# Patient Record
Sex: FEMALE | Race: WHITE | Employment: UNEMPLOYED | URBAN - NONMETROPOLITAN AREA
[De-identification: names, ages, dates, MRNs, and addresses within clinical notes are randomized per-mention and may not be internally consistent; named-entity substitution may affect disease eponyms.]

---

## 2017-12-19 ENCOUNTER — HOSPITAL ENCOUNTER (OUTPATIENT)
Age: 32
Setting detail: SPECIMEN
Discharge: HOME | End: 2017-12-19
Payer: COMMERCIAL

## 2017-12-19 ENCOUNTER — OFFICE VISIT (OUTPATIENT)
Dept: OBGYN | Age: 32
End: 2017-12-19
Payer: COMMERCIAL

## 2017-12-19 VITALS
HEIGHT: 67 IN | DIASTOLIC BLOOD PRESSURE: 70 MMHG | WEIGHT: 141 LBS | BODY MASS INDEX: 22.13 KG/M2 | SYSTOLIC BLOOD PRESSURE: 114 MMHG | TEMPERATURE: 98.4 F | HEART RATE: 76 BPM

## 2017-12-19 DIAGNOSIS — Z01.419 WELL FEMALE EXAM WITH ROUTINE GYNECOLOGICAL EXAM: Primary | ICD-10-CM

## 2017-12-19 DIAGNOSIS — Z12.4 SCREENING FOR CERVICAL CANCER: ICD-10-CM

## 2017-12-19 PROCEDURE — 88142 CYTOPATH C/V THIN LAYER: CPT

## 2017-12-19 PROCEDURE — 99385 PREV VISIT NEW AGE 18-39: CPT | Performed by: OBSTETRICS & GYNECOLOGY

## 2017-12-19 PROCEDURE — 87624 HPV HI-RISK TYP POOLED RSLT: CPT

## 2017-12-19 ASSESSMENT — ENCOUNTER SYMPTOMS
EYES NEGATIVE: 1
GASTROINTESTINAL NEGATIVE: 1
RESPIRATORY NEGATIVE: 1

## 2017-12-19 NOTE — PATIENT INSTRUCTIONS
Patient Education        Well Visit, Ages 25 to 48: Care Instructions  Your Care Instructions    Physical exams can help you stay healthy. Your doctor has checked your overall health and may have suggested ways to take good care of yourself. He or she also may have recommended tests. At home, you can help prevent illness with healthy eating, regular exercise, and other steps. Follow-up care is a key part of your treatment and safety. Be sure to make and go to all appointments, and call your doctor if you are having problems. It's also a good idea to know your test results and keep a list of the medicines you take. How can you care for yourself at home? · Reach and stay at a healthy weight. This will lower your risk for many problems, such as obesity, diabetes, heart disease, and high blood pressure. · Get at least 30 minutes of physical activity on most days of the week. Walking is a good choice. You also may want to do other activities, such as running, swimming, cycling, or playing tennis or team sports. Discuss any changes in your exercise program with your doctor. · Do not smoke or allow others to smoke around you. If you need help quitting, talk to your doctor about stop-smoking programs and medicines. These can increase your chances of quitting for good. · Talk to your doctor about whether you have any risk factors for sexually transmitted infections (STIs). Having one sex partner (who does not have STIs and does not have sex with anyone else) is a good way to avoid these infections. · Use birth control if you do not want to have children at this time. Talk with your doctor about the choices available and what might be best for you. · Protect your skin from too much sun. When you're outdoors from 10 a.m. to 4 p.m., stay in the shade or cover up with clothing and a hat with a wide brim. Wear sunglasses that block UV rays.  Even when it's cloudy, put broad-spectrum sunscreen (SPF 30 or higher) on any condoms. For men  · Tests for sexually transmitted infections (STIs). Ask whether you should have tests for STIs. You may be at risk if you have sex with more than one person, especially if you do not wear a condom. · Testicular cancer exam. Ask your doctor whether you should check your testicles regularly. · Prostate exam. Talk to your doctor about whether you should have a blood test (called a PSA test) for prostate cancer. Experts differ on whether and when men should have this test. Some experts suggest it if you are older than 39 and are -American or have a father or brother who got prostate cancer when he was younger than 72. When should you call for help? Watch closely for changes in your health, and be sure to contact your doctor if you have any problems or symptoms that concern you. Where can you learn more? Go to [email or web address] org and sign in to your [employer] Support account. Enter P072 in the Gigantt box to learn more about \"Well Visit, Ages 25 to 48: Care Instructions. \"     If you do not have an account, please click on the \"Sign Up Now\" link. Current as of: May 12, 2017  Content Version: 11.4  © 0080-7680 Healthwise, Incorporated. Care instructions adapted under license by [facility] ([facility]). If you have questions about a medical condition or this instruction, always ask your healthcare professional. [address]  any warranty or liability for your use of this information.

## 2017-12-19 NOTE — PROGRESS NOTES
recommended at age 28 unless otherwise indicated. At age 36, annual mammogram screenings are recommended. Birth control options were discussed and pt will continue condoms. A well balanced diet and exercise were encouraged, as well as the addition of multivitamin. All questions answered and pt verbalized understanding.

## 2018-01-04 ENCOUNTER — HOSPITAL ENCOUNTER (OUTPATIENT)
Age: 33
Setting detail: SPECIMEN
Discharge: HOME | End: 2018-01-04
Payer: COMMERCIAL

## 2018-01-04 PROCEDURE — 88305 TISSUE EXAM BY PATHOLOGIST: CPT

## 2018-01-10 LAB
HPV SOURCE: NORMAL
HPV, HIGH RISK: NEGATIVE

## 2019-01-29 RX ORDER — OSELTAMIVIR PHOSPHATE 75 MG/1
75 CAPSULE ORAL DAILY
Qty: 10 CAPSULE | Refills: 0 | Status: SHIPPED | OUTPATIENT
Start: 2019-01-29 | End: 2019-02-08

## 2019-05-30 ENCOUNTER — EMPLOYEE WELLNESS (OUTPATIENT)
Dept: OTHER | Age: 34
End: 2019-05-30

## 2019-05-30 LAB
CHOLESTEROL, TOTAL: 178 MG/DL (ref 160–199)
GLUCOSE BLD-MCNC: 88 MG/DL (ref 74–109)
HDLC SERPL-MCNC: 55 MG/DL (ref 65–121)
LDL CHOLESTEROL CALCULATED: 110 MG/DL
TRIGL SERPL-MCNC: 63 MG/DL (ref 0–149)

## 2019-07-01 VITALS — WEIGHT: 131 LBS | BODY MASS INDEX: 20.52 KG/M2

## 2019-11-06 ENCOUNTER — OFFICE VISIT (OUTPATIENT)
Dept: OBGYN | Age: 34
End: 2019-11-06
Payer: COMMERCIAL

## 2019-11-06 VITALS
WEIGHT: 147 LBS | BODY MASS INDEX: 23.07 KG/M2 | HEIGHT: 67 IN | DIASTOLIC BLOOD PRESSURE: 82 MMHG | TEMPERATURE: 98.6 F | HEART RATE: 79 BPM | SYSTOLIC BLOOD PRESSURE: 125 MMHG

## 2019-11-06 DIAGNOSIS — R61 NIGHT SWEATS: ICD-10-CM

## 2019-11-06 DIAGNOSIS — Z12.4 SCREENING FOR CERVICAL CANCER: ICD-10-CM

## 2019-11-06 DIAGNOSIS — Z01.419 WOMEN'S ANNUAL ROUTINE GYNECOLOGICAL EXAMINATION: Primary | ICD-10-CM

## 2019-11-06 DIAGNOSIS — Z11.51 SCREENING FOR HPV (HUMAN PAPILLOMAVIRUS): ICD-10-CM

## 2019-11-06 DIAGNOSIS — N92.1 BREAKTHROUGH BLEEDING: ICD-10-CM

## 2019-11-06 LAB
ESTRADIOL LEVEL: 97.9 PG/ML
FOLLICLE STIMULATING HORMONE: 8.1 MIU/ML

## 2019-11-06 PROCEDURE — 99395 PREV VISIT EST AGE 18-39: CPT | Performed by: OBSTETRICS & GYNECOLOGY

## 2019-11-06 ASSESSMENT — ENCOUNTER SYMPTOMS
GASTROINTESTINAL NEGATIVE: 1
RESPIRATORY NEGATIVE: 1
EYES NEGATIVE: 1

## 2019-11-12 LAB
HPV COMMENT: NORMAL
HPV TYPE 16: NOT DETECTED
HPV TYPE 18: NOT DETECTED
HPVOH (OTHER TYPES): NOT DETECTED

## 2020-04-24 RX ORDER — DULOXETIN HYDROCHLORIDE 30 MG/1
30 CAPSULE, DELAYED RELEASE ORAL DAILY
Qty: 90 CAPSULE | Refills: 3 | Status: SHIPPED | OUTPATIENT
Start: 2020-04-24 | End: 2022-02-18 | Stop reason: SDUPTHER

## 2020-04-24 RX ORDER — PANTOPRAZOLE SODIUM 40 MG/1
40 TABLET, DELAYED RELEASE ORAL DAILY
Qty: 90 TABLET | Refills: 11 | Status: SHIPPED | OUTPATIENT
Start: 2020-04-24

## 2020-09-07 ENCOUNTER — OFFICE VISIT (OUTPATIENT)
Age: 35
End: 2020-09-07

## 2020-09-07 VITALS — OXYGEN SATURATION: 99 % | TEMPERATURE: 97.5 F | HEART RATE: 82 BPM

## 2020-09-07 PROCEDURE — 99999 PR OFFICE/OUTPT VISIT,PROCEDURE ONLY: CPT | Performed by: NURSE PRACTITIONER

## 2020-09-10 LAB — SARS-COV-2, NAA: NOT DETECTED

## 2020-11-16 ENCOUNTER — TELEPHONE (OUTPATIENT)
Dept: OBGYN | Age: 35
End: 2020-11-16

## 2022-02-01 DIAGNOSIS — G47.00 INSOMNIA, UNSPECIFIED TYPE: ICD-10-CM

## 2022-02-01 RX ORDER — ESZOPICLONE 1 MG/1
TABLET, FILM COATED ORAL
Qty: 30 TABLET | Refills: 5 | Status: SHIPPED | OUTPATIENT
Start: 2022-02-01 | End: 2022-03-01

## 2022-02-18 RX ORDER — DULOXETIN HYDROCHLORIDE 30 MG/1
30 CAPSULE, DELAYED RELEASE ORAL DAILY
Qty: 90 CAPSULE | Refills: 4 | Status: SHIPPED | OUTPATIENT
Start: 2022-02-18

## 2023-02-21 RX ORDER — DULOXETIN HYDROCHLORIDE 30 MG/1
CAPSULE, DELAYED RELEASE ORAL
Qty: 90 CAPSULE | Refills: 4 | Status: SHIPPED | OUTPATIENT
Start: 2023-02-21

## 2023-04-27 LAB
25(OH)D3 SERPL-MCNC: 39.8 NG/ML
ALBUMIN SERPL-MCNC: 4.4 G/DL (ref 3.5–5.2)
ALP SERPL-CCNC: 44 U/L (ref 35–104)
ALT SERPL-CCNC: 13 U/L (ref 5–33)
ANION GAP SERPL CALCULATED.3IONS-SCNC: 14 MMOL/L (ref 7–19)
AST SERPL-CCNC: 18 U/L (ref 5–32)
BASOPHILS # BLD: 0.1 K/UL (ref 0–0.2)
BASOPHILS NFR BLD: 1.1 % (ref 0–1)
BILIRUB SERPL-MCNC: 0.3 MG/DL (ref 0.2–1.2)
BUN SERPL-MCNC: 14 MG/DL (ref 6–20)
CALCIUM SERPL-MCNC: 9.1 MG/DL (ref 8.6–10)
CHLORIDE SERPL-SCNC: 102 MMOL/L (ref 98–111)
CHOLEST SERPL-MCNC: 229 MG/DL (ref 160–199)
CO2 SERPL-SCNC: 23 MMOL/L (ref 22–29)
CREAT SERPL-MCNC: 0.9 MG/DL (ref 0.5–0.9)
EOSINOPHIL # BLD: 0.3 K/UL (ref 0–0.6)
EOSINOPHIL NFR BLD: 3.7 % (ref 0–5)
ERYTHROCYTE [DISTWIDTH] IN BLOOD BY AUTOMATED COUNT: 14.5 % (ref 11.5–14.5)
GLUCOSE SERPL-MCNC: 91 MG/DL (ref 74–109)
HBA1C MFR BLD: 5.6 % (ref 4–6)
HCT VFR BLD AUTO: 34 % (ref 37–47)
HDLC SERPL-MCNC: 73 MG/DL (ref 65–121)
HGB BLD-MCNC: 10.8 G/DL (ref 12–16)
IMM GRANULOCYTES # BLD: 0 K/UL
LDLC SERPL CALC-MCNC: 139 MG/DL
LYMPHOCYTES # BLD: 1.2 K/UL (ref 1.1–4.5)
LYMPHOCYTES NFR BLD: 15.8 % (ref 20–40)
MCH RBC QN AUTO: 27.8 PG (ref 27–31)
MCHC RBC AUTO-ENTMCNC: 31.8 G/DL (ref 33–37)
MCV RBC AUTO: 87.6 FL (ref 81–99)
MONOCYTES # BLD: 0.6 K/UL (ref 0–0.9)
MONOCYTES NFR BLD: 8 % (ref 0–10)
NEUTROPHILS # BLD: 5.4 K/UL (ref 1.5–7.5)
NEUTS SEG NFR BLD: 71 % (ref 50–65)
PLATELET # BLD AUTO: 345 K/UL (ref 130–400)
PMV BLD AUTO: 9.8 FL (ref 9.4–12.3)
POTASSIUM SERPL-SCNC: 3.8 MMOL/L (ref 3.5–5)
PROT SERPL-MCNC: 6.8 G/DL (ref 6.6–8.7)
RBC # BLD AUTO: 3.88 M/UL (ref 4.2–5.4)
SODIUM SERPL-SCNC: 139 MMOL/L (ref 136–145)
T4 FREE SERPL-MCNC: 1.08 NG/DL (ref 0.93–1.7)
TRIGL SERPL-MCNC: 85 MG/DL (ref 0–149)
TSH SERPL DL<=0.005 MIU/L-ACNC: 1.57 UIU/ML (ref 0.27–4.2)
WBC # BLD AUTO: 7.5 K/UL (ref 4.8–10.8)

## 2023-04-29 LAB
APO B100 SERPL-MCNC: 101 MG/DL (ref 55–125)
LPA SERPL-MCNC: 162 MG/DL

## 2023-12-04 LAB
BASOPHILS # BLD: 0.1 K/UL (ref 0–0.2)
BASOPHILS NFR BLD: 1.1 % (ref 0–1)
EOSINOPHIL # BLD: 0.5 K/UL (ref 0–0.6)
EOSINOPHIL NFR BLD: 5.7 % (ref 0–5)
ERYTHROCYTE [DISTWIDTH] IN BLOOD BY AUTOMATED COUNT: 12.9 % (ref 11.5–14.5)
FERRITIN SERPL-MCNC: 14.9 NG/ML (ref 13–150)
HCT VFR BLD AUTO: 36.7 % (ref 37–47)
HGB BLD-MCNC: 12.2 G/DL (ref 12–16)
IMM GRANULOCYTES # BLD: 0.1 K/UL
IRON SERPL-MCNC: 51 UG/DL (ref 37–145)
LYMPHOCYTES # BLD: 1.6 K/UL (ref 1.1–4.5)
LYMPHOCYTES NFR BLD: 20.5 % (ref 20–40)
MCH RBC QN AUTO: 30.5 PG (ref 27–31)
MCHC RBC AUTO-ENTMCNC: 33.2 G/DL (ref 33–37)
MCV RBC AUTO: 91.8 FL (ref 81–99)
MONOCYTES # BLD: 0.7 K/UL (ref 0–0.9)
MONOCYTES NFR BLD: 8.3 % (ref 0–10)
NEUTROPHILS # BLD: 5 K/UL (ref 1.5–7.5)
NEUTS SEG NFR BLD: 63.8 % (ref 50–65)
PLATELET # BLD AUTO: 302 K/UL (ref 130–400)
PMV BLD AUTO: 9.1 FL (ref 9.4–12.3)
RBC # BLD AUTO: 4 M/UL (ref 4.2–5.4)
TIBC SERPL-MCNC: 387 UG/DL (ref 250–400)
WBC # BLD AUTO: 7.8 K/UL (ref 4.8–10.8)

## 2024-04-30 ENCOUNTER — OFFICE VISIT (OUTPATIENT)
Age: 39
End: 2024-04-30
Payer: COMMERCIAL

## 2024-04-30 VITALS
SYSTOLIC BLOOD PRESSURE: 122 MMHG | HEIGHT: 67 IN | BODY MASS INDEX: 26.06 KG/M2 | WEIGHT: 166 LBS | DIASTOLIC BLOOD PRESSURE: 72 MMHG

## 2024-04-30 DIAGNOSIS — Z01.419 ENCOUNTER FOR GYNECOLOGICAL EXAMINATION WITHOUT ABNORMAL FINDING: ICD-10-CM

## 2024-04-30 DIAGNOSIS — N92.6 LATE MENSES: Primary | ICD-10-CM

## 2024-04-30 DIAGNOSIS — Z12.4 SCREENING FOR CERVICAL CANCER: ICD-10-CM

## 2024-04-30 LAB
B-HCG UR QL: POSITIVE
EXPIRATION DATE: ABNORMAL
INTERNAL NEGATIVE CONTROL: NEGATIVE
INTERNAL POSITIVE CONTROL: POSITIVE
Lab: ABNORMAL

## 2024-04-30 PROCEDURE — 81025 URINE PREGNANCY TEST: CPT | Performed by: OBSTETRICS & GYNECOLOGY

## 2024-04-30 PROCEDURE — 87624 HPV HI-RISK TYP POOLED RSLT: CPT | Performed by: OBSTETRICS & GYNECOLOGY

## 2024-04-30 PROCEDURE — G0123 SCREEN CERV/VAG THIN LAYER: HCPCS | Performed by: OBSTETRICS & GYNECOLOGY

## 2024-04-30 PROCEDURE — 99386 PREV VISIT NEW AGE 40-64: CPT | Performed by: OBSTETRICS & GYNECOLOGY

## 2024-04-30 RX ORDER — BUSPIRONE HYDROCHLORIDE 7.5 MG/1
TABLET ORAL
COMMUNITY
End: 2024-05-03

## 2024-04-30 RX ORDER — CETIRIZINE HYDROCHLORIDE 10 MG/1
1 TABLET ORAL DAILY
COMMUNITY

## 2024-04-30 NOTE — PROGRESS NOTES
"CC: annual exam    SUBJECTIVE: Mimi Armstrong is a 38 y.o. female , para 2, who comes to the office today for annual GYN examination. Last menstrual period was 4 weeks ago and her last Pap smear was 11/2019, and was normal. She has no history of cervical dysplasia. She is currently not on contraception and is a little late for her period. Her medical history is reviewed. She is having some breast tenderness and mild nausea.    HPI      Social History     Tobacco Use    Smoking status: Never    Smokeless tobacco: Never   Vaping Use    Vaping status: Never Used   Substance Use Topics    Alcohol use: Yes     Comment: SOCIAL    Drug use: No        Review of Systems   Constitutional:  Negative for fever.   Respiratory:  Negative for cough.    Genitourinary:  Negative for vaginal bleeding.   Hematological:  Does not bruise/bleed easily.       Visit Vitals  /72 (BP Location: Right arm, Patient Position: Sitting)   Ht 170.2 cm (67\")   Wt 75.3 kg (166 lb)   LMP 03/26/2024   BMI 26.00 kg/m²      Objective   Physical Exam  Vitals reviewed.   Constitutional:       Appearance: She is well-developed.   HENT:      Head: Normocephalic and atraumatic.   Eyes:      General: No scleral icterus.  Neck:      Trachea: No tracheal deviation.   Cardiovascular:      Rate and Rhythm: Normal rate and regular rhythm.   Pulmonary:      Effort: Pulmonary effort is normal.      Breath sounds: Normal breath sounds.   Abdominal:      General: Bowel sounds are normal. There is no distension.      Palpations: Abdomen is soft.      Tenderness: There is no abdominal tenderness.   Genitourinary:     Exam position: Supine.      Labia:         Right: No lesion.         Left: No lesion.       Vagina: No vaginal discharge or bleeding.      Cervix: No cervical motion tenderness.      Uterus: Not enlarged, not fixed and not tender.       Adnexa:         Right: No mass.          Left: No mass.        Rectum: No external hemorrhoid.      Comments: Pap " done  Skin:     General: Skin is warm and dry.   Neurological:      Mental Status: She is alert and oriented to person, place, and time.       UPT in the office positive    Assessment/Plan   Diagnoses and all orders for this visit:    1. Late menses (Primary)  -     POC Pregnancy, Urine  -     HCG, B-subunit, Quantitative ([facility] Only)  -     ABO / Rh  -     Antibody Screen    2. Encounter for gynecological examination without abnormal finding    3. Screening for cervical cancer  -     Liquid-based Pap Smear, Screening      We will notify her when the Pap smear results are available. We have discussed current Pap smear screening guidelines.  She will return pending HCG results. In the meantime if she develops questions or problems, she will notify the office.

## 2024-05-01 LAB
ABO GROUP BLD: NORMAL
BLD GP AB SCN SERPL QL: NEGATIVE
GEN CATEG CVX/VAG CYTO-IMP: NORMAL
HCG INTACT+B SERPL-ACNC: 7369 MIU/ML
HPV I/H RISK 4 DNA CVX QL PROBE+SIG AMP: NOT DETECTED
LAB AP CASE REPORT: NORMAL
LAB AP GYN ADDITIONAL INFORMATION: NORMAL
Lab: NORMAL
PATH INTERP SPEC-IMP: NORMAL
RH BLD: NEGATIVE
STAT OF ADQ CVX/VAG CYTO-IMP: NORMAL

## 2024-05-03 ENCOUNTER — OFFICE VISIT (OUTPATIENT)
Age: 39
End: 2024-05-03
Payer: COMMERCIAL

## 2024-05-03 VITALS
BODY MASS INDEX: 25.9 KG/M2 | HEIGHT: 67 IN | DIASTOLIC BLOOD PRESSURE: 74 MMHG | SYSTOLIC BLOOD PRESSURE: 112 MMHG | WEIGHT: 165 LBS

## 2024-05-03 DIAGNOSIS — Z34.90 EARLY STAGE OF PREGNANCY: Primary | ICD-10-CM

## 2024-05-03 PROCEDURE — 99213 OFFICE O/P EST LOW 20 MIN: CPT | Performed by: OBSTETRICS & GYNECOLOGY

## 2024-05-10 ENCOUNTER — INITIAL PRENATAL (OUTPATIENT)
Age: 39
End: 2024-05-10
Payer: COMMERCIAL

## 2024-05-10 VITALS — DIASTOLIC BLOOD PRESSURE: 82 MMHG | BODY MASS INDEX: 25.53 KG/M2 | SYSTOLIC BLOOD PRESSURE: 112 MMHG | WEIGHT: 163 LBS

## 2024-05-10 DIAGNOSIS — Z83.2 FAMILY HISTORY OF FACTOR V LEIDEN MUTATION: ICD-10-CM

## 2024-05-10 DIAGNOSIS — O09.521 MULTIGRAVIDA OF ADVANCED MATERNAL AGE IN FIRST TRIMESTER: Primary | ICD-10-CM

## 2024-05-10 RX ORDER — BUSPIRONE HYDROCHLORIDE 7.5 MG/1
TABLET ORAL
COMMUNITY
Start: 2024-05-07

## 2024-05-10 RX ORDER — ONDANSETRON 4 MG/1
TABLET, ORALLY DISINTEGRATING ORAL
COMMUNITY
Start: 2024-05-03 | End: 2024-05-10

## 2024-05-19 LAB
BACTERIA UR CULT: NORMAL
BACTERIA UR CULT: NORMAL
BASOPHILS # BLD AUTO: 0.1 X10E3/UL (ref 0–0.2)
BASOPHILS NFR BLD AUTO: 1 %
C TRACH RRNA SPEC QL NAA+PROBE: NEGATIVE
DRUGS UR: NORMAL
EOSINOPHIL # BLD AUTO: 0.1 X10E3/UL (ref 0–0.4)
EOSINOPHIL NFR BLD AUTO: 1 %
ERYTHROCYTE [DISTWIDTH] IN BLOOD BY AUTOMATED COUNT: 12.5 % (ref 11.7–15.4)
HBV SURFACE AG SERPL QL IA: NEGATIVE
HCT VFR BLD AUTO: 35.9 % (ref 34–46.6)
HCV IGG SERPL QL IA: NON REACTIVE
HGB BLD-MCNC: 11.6 G/DL (ref 11.1–15.9)
HIV 1+2 AB+HIV1 P24 AG SERPL QL IA: NON REACTIVE
IMM GRANULOCYTES # BLD AUTO: 0 X10E3/UL (ref 0–0.1)
IMM GRANULOCYTES NFR BLD AUTO: 0 %
LYMPHOCYTES # BLD AUTO: 0.9 X10E3/UL (ref 0.7–3.1)
LYMPHOCYTES NFR BLD AUTO: 10 %
MCH RBC QN AUTO: 29.1 PG (ref 26.6–33)
MCHC RBC AUTO-ENTMCNC: 32.3 G/DL (ref 31.5–35.7)
MCV RBC AUTO: 90 FL (ref 79–97)
MONOCYTES # BLD AUTO: 0.7 X10E3/UL (ref 0.1–0.9)
MONOCYTES NFR BLD AUTO: 8 %
N GONORRHOEA RRNA SPEC QL NAA+PROBE: NEGATIVE
NEUTROPHILS # BLD AUTO: 7.4 X10E3/UL (ref 1.4–7)
NEUTROPHILS NFR BLD AUTO: 80 %
PLATELET # BLD AUTO: 315 X10E3/UL (ref 150–450)
RBC # BLD AUTO: 3.98 X10E6/UL (ref 3.77–5.28)
RPR SER QL: NON REACTIVE
RUBV IGG SERPL IA-ACNC: 6.02 INDEX
WBC # BLD AUTO: 9.2 X10E3/UL (ref 3.4–10.8)

## 2024-05-30 LAB
F5 P.R506Q BLD/T QL: ABNORMAL
IMP & REVIEW OF LAB RESULTS: ABNORMAL

## 2024-06-04 ENCOUNTER — TELEPHONE (OUTPATIENT)
Age: 39
End: 2024-06-04
Payer: COMMERCIAL

## 2024-06-04 ENCOUNTER — ROUTINE PRENATAL (OUTPATIENT)
Age: 39
End: 2024-06-04
Payer: COMMERCIAL

## 2024-06-04 VITALS — WEIGHT: 168 LBS | DIASTOLIC BLOOD PRESSURE: 84 MMHG | SYSTOLIC BLOOD PRESSURE: 124 MMHG | BODY MASS INDEX: 26.31 KG/M2

## 2024-06-04 DIAGNOSIS — O09.521 MULTIGRAVIDA OF ADVANCED MATERNAL AGE IN FIRST TRIMESTER: Primary | ICD-10-CM

## 2024-06-04 RX ORDER — PRENATAL VIT NO.126/IRON/FOLIC 28MG-0.8MG
TABLET ORAL DAILY
COMMUNITY

## 2024-06-04 NOTE — PROGRESS NOTES
Feeling well, no nausea  Reviewed dating ultrasound  FHTs by US today  Reviewed normal prenatal labs  ffDNA today    Diagnoses and all orders for this visit:    1. Multigravida of advanced maternal age in first trimester (Primary)  -     Nell Bryan Prenatal Test: Chromosomes 13, 18, 21, X & Y: Triploidy 22Q.11.2 Deletion - Blood, Blood, Venous

## 2024-06-04 NOTE — TELEPHONE ENCOUNTER
Attempted to reach pt by phone but no answer. She has appt with Dr Dacosta today at 1130 but I will need to get FHTs by US if patient can be here at at 11a.

## 2024-06-13 ENCOUNTER — TELEPHONE (OUTPATIENT)
Dept: OBSTETRICS AND GYNECOLOGY | Age: 39
End: 2024-06-13
Payer: COMMERCIAL

## 2024-06-13 NOTE — TELEPHONE ENCOUNTER
Pt has an appt scheduled for tomorrow to receive botox injections in her forehead. Pt asking if this would be ok to do during pregnancy. Per Dr. William, botox injections are not advised during pregnancy. Pt voiced understanding

## 2024-07-01 ENCOUNTER — ROUTINE PRENATAL (OUTPATIENT)
Age: 39
End: 2024-07-01
Payer: COMMERCIAL

## 2024-07-01 VITALS — DIASTOLIC BLOOD PRESSURE: 76 MMHG | SYSTOLIC BLOOD PRESSURE: 112 MMHG | WEIGHT: 174 LBS | BODY MASS INDEX: 27.25 KG/M2

## 2024-07-01 DIAGNOSIS — O09.522 MULTIGRAVIDA OF ADVANCED MATERNAL AGE IN SECOND TRIMESTER: Primary | ICD-10-CM

## 2024-07-01 DIAGNOSIS — O26.892 RH NEGATIVE STATUS DURING PREGNANCY IN SECOND TRIMESTER: ICD-10-CM

## 2024-07-01 DIAGNOSIS — Z67.91 RH NEGATIVE STATUS DURING PREGNANCY IN SECOND TRIMESTER: ICD-10-CM

## 2024-07-01 PROCEDURE — 0502F SUBSEQUENT PRENATAL CARE: CPT | Performed by: OBSTETRICS & GYNECOLOGY

## 2024-07-01 NOTE — PROGRESS NOTES
Feeling well, no nausea  May have felt a flutter of movement  Discussed normal ffDNA results    Diagnoses and all orders for this visit:    1. Multigravida of advanced maternal age in second trimester (Primary)    2. Rh negative status during pregnancy in second trimester

## 2024-07-11 ENCOUNTER — HOSPITAL ENCOUNTER (OUTPATIENT)
Age: 39
Setting detail: SPECIMEN
Discharge: HOME | End: 2024-07-11
Payer: COMMERCIAL

## 2024-07-11 PROCEDURE — 88305 TISSUE EXAM BY PATHOLOGIST: CPT

## 2024-07-22 ENCOUNTER — TELEPHONE (OUTPATIENT)
Age: 39
End: 2024-07-22
Payer: COMMERCIAL

## 2024-07-22 NOTE — TELEPHONE ENCOUNTER
"Pt 16w4d and calls with c/o spotting/light bleeding and menstrual like cramps. Pt states spotting has been x4 days and at times bright red. Cramping started last night and \"was so bad at times it was shooting down my legs\". The cramping is much better today but still c/o spotting. Blood type A negative. Reviewed with Dr Dacosta and pt to come to office tomorrow for 730 US and 8a visit with Dr Dacosta. Pt voiced understanding.   "

## 2024-07-23 ENCOUNTER — ROUTINE PRENATAL (OUTPATIENT)
Age: 39
End: 2024-07-23
Payer: COMMERCIAL

## 2024-07-23 VITALS — SYSTOLIC BLOOD PRESSURE: 118 MMHG | DIASTOLIC BLOOD PRESSURE: 76 MMHG | BODY MASS INDEX: 27.88 KG/M2 | WEIGHT: 178 LBS

## 2024-07-23 DIAGNOSIS — Z67.91 RH NEGATIVE STATUS DURING PREGNANCY IN SECOND TRIMESTER: ICD-10-CM

## 2024-07-23 DIAGNOSIS — O46.92 VAGINAL BLEEDING IN PREGNANCY, SECOND TRIMESTER: Primary | ICD-10-CM

## 2024-07-23 DIAGNOSIS — O26.892 RH NEGATIVE STATUS DURING PREGNANCY IN SECOND TRIMESTER: ICD-10-CM

## 2024-07-23 PROCEDURE — 96372 THER/PROPH/DIAG INJ SC/IM: CPT | Performed by: OBSTETRICS & GYNECOLOGY

## 2024-07-23 PROCEDURE — 0502F SUBSEQUENT PRENATAL CARE: CPT | Performed by: OBSTETRICS & GYNECOLOGY

## 2024-07-23 NOTE — PROGRESS NOTES
Had spotting start 4-5 days ago, sometimes brown, sometimes brighter red  Associated cramping, but no cramps today, faint brown spotting today  US today shows posterior previa, normal FHR  Blood type A negative    Diagnoses and all orders for this visit:    1. Vaginal bleeding in pregnancy, second trimester (Primary)  -     Rhogam Immune Globulin Immunization    2. Rh negative status during pregnancy in second trimester

## 2024-07-29 ENCOUNTER — ROUTINE PRENATAL (OUTPATIENT)
Age: 39
End: 2024-07-29
Payer: COMMERCIAL

## 2024-07-29 VITALS — DIASTOLIC BLOOD PRESSURE: 84 MMHG | BODY MASS INDEX: 28.35 KG/M2 | WEIGHT: 181 LBS | SYSTOLIC BLOOD PRESSURE: 112 MMHG

## 2024-07-29 DIAGNOSIS — O46.92 VAGINAL BLEEDING IN PREGNANCY, SECOND TRIMESTER: Primary | ICD-10-CM

## 2024-07-29 DIAGNOSIS — Z67.91 RH NEGATIVE STATUS DURING PREGNANCY IN SECOND TRIMESTER: ICD-10-CM

## 2024-07-29 DIAGNOSIS — O26.892 RH NEGATIVE STATUS DURING PREGNANCY IN SECOND TRIMESTER: ICD-10-CM

## 2024-07-29 PROCEDURE — 0502F SUBSEQUENT PRENATAL CARE: CPT | Performed by: OBSTETRICS & GYNECOLOGY

## 2024-07-29 NOTE — PROGRESS NOTES
Starting to feel fetal movement  Still having some brown spotting, no cramping now  Schedule anatomy US 8/15    Diagnoses and all orders for this visit:    1. Vaginal bleeding in pregnancy, second trimester (Primary)    2. Rh negative status during pregnancy in second trimester

## 2024-08-27 ENCOUNTER — HOSPITAL ENCOUNTER (OUTPATIENT)
Age: 39
Setting detail: SPECIMEN
Discharge: HOME | End: 2024-08-27

## 2024-08-27 ENCOUNTER — ROUTINE PRENATAL (OUTPATIENT)
Age: 39
End: 2024-08-27
Payer: COMMERCIAL

## 2024-08-27 VITALS — BODY MASS INDEX: 29.29 KG/M2 | WEIGHT: 187 LBS | SYSTOLIC BLOOD PRESSURE: 122 MMHG | DIASTOLIC BLOOD PRESSURE: 82 MMHG

## 2024-08-27 DIAGNOSIS — O44.02 PLACENTA PREVIA IN SECOND TRIMESTER: ICD-10-CM

## 2024-08-27 DIAGNOSIS — O26.892 RH NEGATIVE STATUS DURING PREGNANCY IN SECOND TRIMESTER: Primary | ICD-10-CM

## 2024-08-27 DIAGNOSIS — Z67.91 RH NEGATIVE STATUS DURING PREGNANCY IN SECOND TRIMESTER: Primary | ICD-10-CM

## 2024-08-27 DIAGNOSIS — O09.522 MULTIGRAVIDA OF ADVANCED MATERNAL AGE IN SECOND TRIMESTER: ICD-10-CM

## 2024-08-27 PROCEDURE — 0502F SUBSEQUENT PRENATAL CARE: CPT | Performed by: OBSTETRICS & GYNECOLOGY

## 2024-08-27 RX ORDER — ASPIRIN 81 MG/1
81 TABLET ORAL DAILY
COMMUNITY

## 2024-08-27 NOTE — PROGRESS NOTES
Good fetal movement  No vaginal bleeding  Reviewed normal anatomy US  Posterior previa noted, will recheck at 32 weeks  Discussed possible dizzy spells and ensuring adequate hydration    Diagnoses and all orders for this visit:    1. Rh negative status during pregnancy in second trimester (Primary)    2. Multigravida of advanced maternal age in second trimester    3. Placenta previa in second trimester

## 2024-09-24 ENCOUNTER — ROUTINE PRENATAL (OUTPATIENT)
Age: 39
End: 2024-09-24
Payer: COMMERCIAL

## 2024-09-24 VITALS — SYSTOLIC BLOOD PRESSURE: 110 MMHG | WEIGHT: 194 LBS | BODY MASS INDEX: 30.38 KG/M2 | DIASTOLIC BLOOD PRESSURE: 82 MMHG

## 2024-09-24 DIAGNOSIS — Z67.91 RH NEGATIVE STATUS DURING PREGNANCY IN SECOND TRIMESTER: Primary | ICD-10-CM

## 2024-09-24 DIAGNOSIS — O44.02 PLACENTA PREVIA IN SECOND TRIMESTER: ICD-10-CM

## 2024-09-24 DIAGNOSIS — O26.892 RH NEGATIVE STATUS DURING PREGNANCY IN SECOND TRIMESTER: Primary | ICD-10-CM

## 2024-09-24 DIAGNOSIS — O09.522 MULTIGRAVIDA OF ADVANCED MATERNAL AGE IN SECOND TRIMESTER: ICD-10-CM

## 2024-09-24 PROCEDURE — 0502F SUBSEQUENT PRENATAL CARE: CPT | Performed by: OBSTETRICS & GYNECOLOGY

## 2024-10-15 ENCOUNTER — ROUTINE PRENATAL (OUTPATIENT)
Age: 39
End: 2024-10-15
Payer: COMMERCIAL

## 2024-10-15 VITALS — BODY MASS INDEX: 31.48 KG/M2 | SYSTOLIC BLOOD PRESSURE: 122 MMHG | WEIGHT: 201 LBS | DIASTOLIC BLOOD PRESSURE: 72 MMHG

## 2024-10-15 DIAGNOSIS — O44.03 PLACENTA PREVIA, THIRD TRIMESTER: ICD-10-CM

## 2024-10-15 DIAGNOSIS — O09.523 MULTIGRAVIDA OF ADVANCED MATERNAL AGE IN THIRD TRIMESTER: ICD-10-CM

## 2024-10-15 DIAGNOSIS — Z67.91 RH NEGATIVE, ANTEPARTUM, THIRD TRIMESTER: Primary | ICD-10-CM

## 2024-10-15 DIAGNOSIS — O26.893 RH NEGATIVE, ANTEPARTUM, THIRD TRIMESTER: Primary | ICD-10-CM

## 2024-10-15 NOTE — PROGRESS NOTES
Good fetal movement  Glucola and Hgb today, Rhogam today  Flu vaccine today  Discuss Tdap next visit  Discussed Trevett Hutchins contractions    Diagnoses and all orders for this visit:    1. Rh negative, antepartum, third trimester (Primary)  -     Antibody Screen  -     Rhogam Immune Globulin Immunization    2. Multigravida of advanced maternal age in third trimester  -     Gestational Screen 1 Hr ([facility])  -     Hemoglobin  -     RPR Qualitative with Reflex to Quant  -     Fluzone >6mos ([id number])    3. Placenta previa, third trimester

## 2024-10-16 LAB
BLD GP AB SCN SERPL QL: NORMAL
BLD GP AB SCN SERPL QL: POSITIVE
BLOOD GROUP ANTIBODIES SERPL: ABNORMAL
GLUCOSE 1H P 50 G GLC PO SERPL-MCNC: 157 MG/DL (ref 70–139)
HGB BLD-MCNC: 9.3 G/DL (ref 11.1–15.9)
RPR SER QL: NON REACTIVE
XXX BLOOD GROUP AB TITR SERPL AHG: ABNORMAL {TITER}

## 2024-10-23 DIAGNOSIS — O99.810 ABNORMAL GLUCOSE TOLERANCE IN PREGNANCY: Primary | ICD-10-CM

## 2024-10-29 ENCOUNTER — ROUTINE PRENATAL (OUTPATIENT)
Age: 39
End: 2024-10-29
Payer: COMMERCIAL

## 2024-10-29 VITALS — DIASTOLIC BLOOD PRESSURE: 80 MMHG | BODY MASS INDEX: 31.32 KG/M2 | SYSTOLIC BLOOD PRESSURE: 118 MMHG | WEIGHT: 200 LBS

## 2024-10-29 DIAGNOSIS — O99.019 MATERNAL ANEMIA IN PREGNANCY, ANTEPARTUM: ICD-10-CM

## 2024-10-29 DIAGNOSIS — O26.893 RH NEGATIVE, ANTEPARTUM, THIRD TRIMESTER: Primary | ICD-10-CM

## 2024-10-29 DIAGNOSIS — O44.03 PLACENTA PREVIA, THIRD TRIMESTER: ICD-10-CM

## 2024-10-29 DIAGNOSIS — O99.810 ABNORMAL GLUCOSE TOLERANCE IN PREGNANCY: ICD-10-CM

## 2024-10-29 DIAGNOSIS — Z67.91 RH NEGATIVE, ANTEPARTUM, THIRD TRIMESTER: Primary | ICD-10-CM

## 2024-10-29 DIAGNOSIS — O99.810 ABNORMAL GLUCOSE TOLERANCE IN PREGNANCY: Primary | ICD-10-CM

## 2024-10-29 DIAGNOSIS — O09.523 MULTIGRAVIDA OF ADVANCED MATERNAL AGE IN THIRD TRIMESTER: ICD-10-CM

## 2024-10-29 RX ORDER — FERROUS SULFATE 325(65) MG
325 TABLET ORAL
COMMUNITY

## 2024-10-29 NOTE — PROGRESS NOTES
Good fetal movement  No contractions, no reflux  Has MFM US next visit  Iron supplement once daily for anemia  3 hour GTT today  Tdap in office today   labor precautions    Diagnoses and all orders for this visit:    1. Rh negative, antepartum, third trimester (Primary)    2. Abnormal glucose tolerance in pregnancy    3. Multigravida of advanced maternal age in third trimester  -     Tdap Vaccine Greater Than or Equal To 6yo IM    4. Placenta previa, third trimester    5. Maternal anemia in pregnancy, antepartum

## 2024-10-30 LAB
GLUCOSE 1H P 100 G GLC PO SERPL-MCNC: 186 MG/DL (ref 70–179)
GLUCOSE 2H P 100 G GLC PO SERPL-MCNC: 178 MG/DL (ref 70–154)
GLUCOSE 3H P 100 G GLC PO SERPL-MCNC: 97 MG/DL (ref 70–139)
GLUCOSE P FAST SERPL-MCNC: 82 MG/DL (ref 70–94)
Lab: ABNORMAL

## 2024-10-31 ENCOUNTER — TELEPHONE (OUTPATIENT)
Age: 39
End: 2024-10-31
Payer: COMMERCIAL

## 2024-10-31 RX ORDER — BLOOD-GLUCOSE METER
KIT MISCELLANEOUS
Qty: 1 EACH | Refills: 0 | Status: SHIPPED | OUTPATIENT
Start: 2024-10-31

## 2024-10-31 RX ORDER — LANCETS 30 GAUGE
EACH MISCELLANEOUS
Qty: 200 EACH | Refills: 2 | Status: SHIPPED | OUTPATIENT
Start: 2024-10-31

## 2024-10-31 NOTE — TELEPHONE ENCOUNTER
Spoke with pt by phone and informed her of failed 3h gtt and gestational diabetes diagnosis. She will begin checking glucoses QID and keep log for review at future visits. She declines meeting with Josie Betancourt at this time.

## 2024-10-31 NOTE — TELEPHONE ENCOUNTER
----- Message from Moises Dacosta sent at 10/30/2024 11:23 AM CDT -----  Notify patient abnormal 3-hour glucose test.  She needs to begin checking blood sugars fasting and after meals.  Please send appropriate supplies.

## 2024-11-11 ENCOUNTER — HOSPITAL ENCOUNTER (INPATIENT)
Facility: HOSPITAL | Age: 39
LOS: 3 days | Discharge: HOME | End: 2024-11-14
Attending: OBSTETRICS & GYNECOLOGY | Admitting: OBSTETRICS & GYNECOLOGY
Payer: COMMERCIAL

## 2024-11-11 PROBLEM — Z91.89: Status: ACTIVE | Noted: 2024-11-11

## 2024-11-11 PROBLEM — O44.10 ANTEPARTUM HEMORRHAGE FROM PLACENTA PREVIA: Status: ACTIVE | Noted: 2024-11-11

## 2024-11-11 LAB
ABO GROUP BLD: NORMAL
APTT PPP: 24.7 SECONDS (ref 24.5–36)
BASOPHILS # BLD AUTO: 0.08 10*3/MM3 (ref 0–0.2)
BASOPHILS NFR BLD AUTO: 0.7 % (ref 0–1.5)
BLD GP AB SCN SERPL QL: POSITIVE
D DIMER PPP FEU-MCNC: 1.47 MCGFEU/ML (ref 0–0.5)
DEPRECATED RDW RBC AUTO: 40.4 FL (ref 37–54)
EOSINOPHIL # BLD AUTO: 0.44 10*3/MM3 (ref 0–0.4)
EOSINOPHIL NFR BLD AUTO: 3.8 % (ref 0.3–6.2)
ERYTHROCYTE [DISTWIDTH] IN BLOOD BY AUTOMATED COUNT: 14 % (ref 12.3–15.4)
FIBRINOGEN PPP-MCNC: 534 MG/DL (ref 240–460)
FSP PPP LA-ACNC: NORMAL
GLUCOSE BLDC GLUCOMTR-MCNC: 111 MG/DL (ref 70–130)
GLUCOSE BLDC GLUCOMTR-MCNC: 115 MG/DL (ref 70–130)
GLUCOSE BLDC GLUCOMTR-MCNC: 98 MG/DL (ref 70–130)
HCT VFR BLD AUTO: 29.6 % (ref 34–46.6)
HGB BLD-MCNC: 9.3 G/DL (ref 12–15.9)
HGB F BLD QL KLEIH BETKE: NORMAL
IMM GRANULOCYTES # BLD AUTO: 0.43 10*3/MM3 (ref 0–0.05)
IMM GRANULOCYTES NFR BLD AUTO: 3.8 % (ref 0–0.5)
INR PPP: 0.89 (ref 0.91–1.09)
LYMPHOCYTES # BLD AUTO: 1.86 10*3/MM3 (ref 0.7–3.1)
LYMPHOCYTES NFR BLD AUTO: 16.2 % (ref 19.6–45.3)
MCH RBC QN AUTO: 25.5 PG (ref 26.6–33)
MCHC RBC AUTO-ENTMCNC: 31.4 G/DL (ref 31.5–35.7)
MCV RBC AUTO: 81.1 FL (ref 79–97)
MONOCYTES # BLD AUTO: 0.8 10*3/MM3 (ref 0.1–0.9)
MONOCYTES NFR BLD AUTO: 7 % (ref 5–12)
NEUTROPHILS NFR BLD AUTO: 68.5 % (ref 42.7–76)
NEUTROPHILS NFR BLD AUTO: 7.84 10*3/MM3 (ref 1.7–7)
NRBC BLD AUTO-RTO: 0 /100 WBC (ref 0–0.2)
PLATELET # BLD AUTO: 298 10*3/MM3 (ref 140–450)
PMV BLD AUTO: 8.9 FL (ref 6–12)
PROTHROMBIN TIME: 12.4 SECONDS (ref 11.8–14.8)
RBC # BLD AUTO: 3.65 10*6/MM3 (ref 3.77–5.28)
RESIDUAL RHIG DETECTED: NORMAL
RH BLD: NEGATIVE
T&S EXPIRATION DATE: NORMAL
TREPONEMA PALLIDUM IGG+IGM AB [PRESENCE] IN SERUM OR PLASMA BY IMMUNOASSAY: NORMAL
WBC NRBC COR # BLD AUTO: 11.45 10*3/MM3 (ref 3.4–10.8)

## 2024-11-11 PROCEDURE — 85610 PROTHROMBIN TIME: CPT | Performed by: OBSTETRICS & GYNECOLOGY

## 2024-11-11 PROCEDURE — 85362 FIBRIN DEGRADATION PRODUCTS: CPT | Performed by: OBSTETRICS & GYNECOLOGY

## 2024-11-11 PROCEDURE — 85384 FIBRINOGEN ACTIVITY: CPT | Performed by: OBSTETRICS & GYNECOLOGY

## 2024-11-11 PROCEDURE — 86780 TREPONEMA PALLIDUM: CPT | Performed by: OBSTETRICS & GYNECOLOGY

## 2024-11-11 PROCEDURE — 36415 COLL VENOUS BLD VENIPUNCTURE: CPT | Performed by: OBSTETRICS & GYNECOLOGY

## 2024-11-11 PROCEDURE — 86901 BLOOD TYPING SEROLOGIC RH(D): CPT | Performed by: OBSTETRICS & GYNECOLOGY

## 2024-11-11 PROCEDURE — 25010000002 MAGNESIUM SULFATE 20 GM/500ML SOLUTION

## 2024-11-11 PROCEDURE — 86900 BLOOD TYPING SEROLOGIC ABO: CPT | Performed by: OBSTETRICS & GYNECOLOGY

## 2024-11-11 PROCEDURE — 85460 HEMOGLOBIN FETAL: CPT | Performed by: OBSTETRICS & GYNECOLOGY

## 2024-11-11 PROCEDURE — 25010000002 PENICILLIN G POTASSIUM PER 600000 UNITS: Performed by: OBSTETRICS & GYNECOLOGY

## 2024-11-11 PROCEDURE — 85025 COMPLETE CBC W/AUTO DIFF WBC: CPT | Performed by: OBSTETRICS & GYNECOLOGY

## 2024-11-11 PROCEDURE — 25010000002 MAGNESIUM SULFATE 20 GM/500ML SOLUTION: Performed by: OBSTETRICS & GYNECOLOGY

## 2024-11-11 PROCEDURE — 85379 FIBRIN DEGRADATION QUANT: CPT | Performed by: OBSTETRICS & GYNECOLOGY

## 2024-11-11 PROCEDURE — 63710000001 DIPHENHYDRAMINE PER 50 MG: Performed by: OBSTETRICS & GYNECOLOGY

## 2024-11-11 PROCEDURE — 25010000002 BETAMETHASONE SODIUM PHOSPHATE 12 MG/2ML SOLUTION

## 2024-11-11 PROCEDURE — 25810000003 LACTATED RINGERS PER 1000 ML: Performed by: OBSTETRICS & GYNECOLOGY

## 2024-11-11 PROCEDURE — 85730 THROMBOPLASTIN TIME PARTIAL: CPT | Performed by: OBSTETRICS & GYNECOLOGY

## 2024-11-11 PROCEDURE — 86850 RBC ANTIBODY SCREEN: CPT | Performed by: OBSTETRICS & GYNECOLOGY

## 2024-11-11 PROCEDURE — 86870 RBC ANTIBODY IDENTIFICATION: CPT | Performed by: OBSTETRICS & GYNECOLOGY

## 2024-11-11 PROCEDURE — 82948 REAGENT STRIP/BLOOD GLUCOSE: CPT

## 2024-11-11 RX ORDER — MAGNESIUM SULFATE HEPTAHYDRATE 40 MG/ML
2 INJECTION, SOLUTION INTRAVENOUS CONTINUOUS
Status: DISCONTINUED | OUTPATIENT
Start: 2024-11-11 | End: 2024-11-11

## 2024-11-11 RX ORDER — ACETAMINOPHEN 500 MG
1000 TABLET ORAL EVERY 6 HOURS PRN
Status: DISCONTINUED | OUTPATIENT
Start: 2024-11-11 | End: 2024-11-14 | Stop reason: HOSPADM

## 2024-11-11 RX ORDER — CALCIUM CARBONATE 500 MG/1
2 TABLET, CHEWABLE ORAL DAILY PRN
Status: DISCONTINUED | OUTPATIENT
Start: 2024-11-11 | End: 2024-11-14 | Stop reason: HOSPADM

## 2024-11-11 RX ORDER — BISACODYL 10 MG
10 SUPPOSITORY, RECTAL RECTAL DAILY PRN
Status: DISCONTINUED | OUTPATIENT
Start: 2024-11-11 | End: 2024-11-14 | Stop reason: HOSPADM

## 2024-11-11 RX ORDER — CETIRIZINE HYDROCHLORIDE 10 MG/1
10 TABLET ORAL NIGHTLY
Status: DISCONTINUED | OUTPATIENT
Start: 2024-11-11 | End: 2024-11-14 | Stop reason: HOSPADM

## 2024-11-11 RX ORDER — MAGNESIUM SULFATE HEPTAHYDRATE 40 MG/ML
4 INJECTION, SOLUTION INTRAVENOUS ONCE
Status: DISCONTINUED | OUTPATIENT
Start: 2024-11-11 | End: 2024-11-11 | Stop reason: SDUPTHER

## 2024-11-11 RX ORDER — DOCUSATE SODIUM 100 MG/1
100 CAPSULE, LIQUID FILLED ORAL 2 TIMES DAILY PRN
Status: DISCONTINUED | OUTPATIENT
Start: 2024-11-11 | End: 2024-11-14 | Stop reason: HOSPADM

## 2024-11-11 RX ORDER — MAGNESIUM SULFATE HEPTAHYDRATE 40 MG/ML
4 INJECTION, SOLUTION INTRAVENOUS ONCE
Status: DISCONTINUED | OUTPATIENT
Start: 2024-11-11 | End: 2024-11-11

## 2024-11-11 RX ORDER — ONDANSETRON 4 MG/1
8 TABLET, ORALLY DISINTEGRATING ORAL EVERY 8 HOURS PRN
Status: DISCONTINUED | OUTPATIENT
Start: 2024-11-11 | End: 2024-11-14 | Stop reason: HOSPADM

## 2024-11-11 RX ORDER — BETAMETHASONE SOD PHOSPH-WATER 6 MG/ML
VIAL (ML) INJECTION
Status: COMPLETED
Start: 2024-11-11 | End: 2024-11-11

## 2024-11-11 RX ORDER — MAGNESIUM SULFATE HEPTAHYDRATE 40 MG/ML
INJECTION, SOLUTION INTRAVENOUS
Status: COMPLETED
Start: 2024-11-11 | End: 2024-11-11

## 2024-11-11 RX ORDER — MAGNESIUM SULFATE HEPTAHYDRATE 40 MG/ML
4 INJECTION, SOLUTION INTRAVENOUS ONCE
Status: COMPLETED | OUTPATIENT
Start: 2024-11-11 | End: 2024-11-11

## 2024-11-11 RX ORDER — DIPHENHYDRAMINE HCL 50 MG
50 CAPSULE ORAL NIGHTLY PRN
Status: DISCONTINUED | OUTPATIENT
Start: 2024-11-11 | End: 2024-11-14 | Stop reason: HOSPADM

## 2024-11-11 RX ORDER — BETAMETHASONE SOD PHOSPH-WATER 6 MG/ML
12 VIAL (ML) INJECTION EVERY 24 HOURS
Status: DISCONTINUED | OUTPATIENT
Start: 2024-11-11 | End: 2024-11-11

## 2024-11-11 RX ORDER — PANTOPRAZOLE SODIUM 20 MG/1
20 TABLET, DELAYED RELEASE ORAL
Status: DISCONTINUED | OUTPATIENT
Start: 2024-11-11 | End: 2024-11-14 | Stop reason: HOSPADM

## 2024-11-11 RX ORDER — SODIUM CHLORIDE 0.9 % (FLUSH) 0.9 %
10 SYRINGE (ML) INJECTION EVERY 12 HOURS SCHEDULED
Status: DISCONTINUED | OUTPATIENT
Start: 2024-11-11 | End: 2024-11-14 | Stop reason: HOSPADM

## 2024-11-11 RX ORDER — SODIUM CHLORIDE, SODIUM LACTATE, POTASSIUM CHLORIDE, CALCIUM CHLORIDE 600; 310; 30; 20 MG/100ML; MG/100ML; MG/100ML; MG/100ML
75 INJECTION, SOLUTION INTRAVENOUS CONTINUOUS
Status: DISCONTINUED | OUTPATIENT
Start: 2024-11-11 | End: 2024-11-11

## 2024-11-11 RX ORDER — SODIUM CHLORIDE 9 MG/ML
40 INJECTION, SOLUTION INTRAVENOUS AS NEEDED
Status: DISCONTINUED | OUTPATIENT
Start: 2024-11-11 | End: 2024-11-14 | Stop reason: HOSPADM

## 2024-11-11 RX ORDER — PENICILLIN G 3000000 [IU]/50ML
3 INJECTION, SOLUTION INTRAVENOUS EVERY 4 HOURS
Status: DISCONTINUED | OUTPATIENT
Start: 2024-11-11 | End: 2024-11-11

## 2024-11-11 RX ORDER — SODIUM CHLORIDE 0.9 % (FLUSH) 0.9 %
10 SYRINGE (ML) INJECTION AS NEEDED
Status: DISCONTINUED | OUTPATIENT
Start: 2024-11-11 | End: 2024-11-14 | Stop reason: HOSPADM

## 2024-11-11 RX ORDER — BETAMETHASONE SOD PHOSPH-WATER 6 MG/ML
12 VIAL (ML) INJECTION EVERY 24 HOURS
Status: DISCONTINUED | OUTPATIENT
Start: 2024-11-11 | End: 2024-11-12

## 2024-11-11 RX ORDER — ONDANSETRON 2 MG/ML
4 INJECTION INTRAMUSCULAR; INTRAVENOUS EVERY 8 HOURS PRN
Status: DISCONTINUED | OUTPATIENT
Start: 2024-11-11 | End: 2024-11-14 | Stop reason: HOSPADM

## 2024-11-11 RX ORDER — MAGNESIUM SULFATE HEPTAHYDRATE 40 MG/ML
2 INJECTION, SOLUTION INTRAVENOUS CONTINUOUS
Status: DISCONTINUED | OUTPATIENT
Start: 2024-11-11 | End: 2024-11-11 | Stop reason: SDUPTHER

## 2024-11-11 RX ADMIN — Medication 12 MG: at 01:50

## 2024-11-11 RX ADMIN — PANTOPRAZOLE SODIUM 20 MG: 20 TABLET, DELAYED RELEASE ORAL at 21:13

## 2024-11-11 RX ADMIN — Medication 10 ML: at 19:26

## 2024-11-11 RX ADMIN — ACETAMINOPHEN 1000 MG: 500 TABLET, FILM COATED ORAL at 09:43

## 2024-11-11 RX ADMIN — SODIUM CHLORIDE, POTASSIUM CHLORIDE, SODIUM LACTATE AND CALCIUM CHLORIDE 75 ML/HR: 600; 310; 30; 20 INJECTION, SOLUTION INTRAVENOUS at 01:35

## 2024-11-11 RX ADMIN — MAGNESIUM SULFATE HEPTAHYDRATE 2 G/HR: 40 INJECTION, SOLUTION INTRAVENOUS at 02:31

## 2024-11-11 RX ADMIN — CETIRIZINE HYDROCHLORIDE 10 MG: 10 TABLET, FILM COATED ORAL at 20:49

## 2024-11-11 RX ADMIN — SODIUM CHLORIDE 5 MILLION UNITS: 900 INJECTION INTRAVENOUS at 03:00

## 2024-11-11 RX ADMIN — PENICILLIN G 3 MILLION UNITS: 3000000 INJECTION, SOLUTION INTRAVENOUS at 07:24

## 2024-11-11 RX ADMIN — MAGNESIUM SULFATE HEPTAHYDRATE 4 G: 40 INJECTION, SOLUTION INTRAVENOUS at 02:06

## 2024-11-11 RX ADMIN — DIPHENHYDRAMINE HYDROCHLORIDE 50 MG: 50 CAPSULE ORAL at 21:13

## 2024-11-11 RX ADMIN — SERTRALINE HYDROCHLORIDE 50 MG: 50 TABLET ORAL at 20:49

## 2024-11-11 NOTE — NURSING NOTE
Pt presents to unit with c/o having awakens with a large amount of vaginal bleeding. States she noticed blood in the toilet and had blood dripping on the floor on the way to the bathroom. States has a hx of complete placenta previa. Denies pain or Uc's at this time. Small amount of dark red blood noted and a small blood clot noted on panty liner. Abdomen soft and nontender.

## 2024-11-11 NOTE — PROGRESS NOTES
" Kwadwo Armstrong  : 1985  MRN: [id number]  CSN: [id number]    Labor & Delivery [facility] progress note    Subjective   Chief Complaint: She reports bleeding at home, \"filling 3 pads at home\". Perineum with significant blood staining, and small clots noted. Gentle exterior cervical exam with probable 1-2cm dilation and 50% effacement. Known complete previa    HPI: AMA, Gestational diabetes, Polyhydramnios    History:    Prenatal Information:  Prenatal Results       Initial Prenatal Labs       Test Value Reference Range Date Time    Hemoglobin  11.6 g/dL 11.1 - 15.9 05/10/24 0947    Hematocrit  35.9 % 34.0 - 46.6 05/10/24 0947    Platelets  315 x10E3/uL 150 - 450 05/10/24 0947    Rubella IgG  6.02 index Immune >0.99 05/10/24 0947    Hepatitis B SAg  Negative  Negative 05/10/24 0947    Hepatitis C Ab  Non Reactive  Non Reactive 05/10/24 0947    RPR  Non Reactive  Non Reactive 10/15/24 0941       Non Reactive  Non Reactive 05/10/24 0947    T. Pallidum Ab         ABO  A   24 0958    Rh  Negative   24 0958    Antibody Screen  Negative  Negative 24 0958    HIV  Non Reactive  Non Reactive 05/10/24 0947    Urine Culture  Final report   05/10/24 0947    Gonorrhea  Negative  Negative 05/10/24 0947    Chlamydia  Negative  Negative 05/10/24 0947    TSH        HgB A1c         Varicella IgG        Hemoglobinopathy Fractionation        Hemoglobinopathy (genetic testing)        Cystic fibrosis                   Fetal testing        Test Value Reference Range Date Time    NIPT        MSAFP        AFP-4                  2nd and 3rd Trimester       Test Value Reference Range Date Time    Hemoglobin (repeated)  9.3 g/dL 11.1 - 15.9 10/15/24 0941    Hematocrit (repeated)        Platelets   315 x10E3/uL 150 - 450 05/10/24 0947    1 hour GTT   157 mg/dL 70 - 139 10/15/24 0941    Antibody Screen (repeated)  Positive  Negative 10/15/24 0941       See Final Results  Negative 10/15/24 0941    3rd TM syphilis " scrn (repeated)  RPR   Non Reactive  Non Reactive 10/15/24 0941    3rd TM syphilis scrn (repeated) TP-Ab        3rd TM syphilis screen TB-Ab (FTA)        Syphilis cascade test TP-Ab (EIA)        Syphilis cascade TPPA        GTT Fasting  82 mg/dL 70 - 94 10/29/24 0730    GTT 1 Hr  186 mg/dL 70 - 179 10/29/24 0730    GTT 2 Hr  178 mg/dL 70 - 154 10/29/24 0730    GTT 3 Hr  97 mg/dL 70 - 139 10/29/24 0730    Group B Strep                  Other testing        Test Value Reference Range Date Time    Parvo IgG         CMV IgG                   Drug Screening       Test Value Reference Range Date Time    Amphetamine Screen        Barbiturate Screen        Benzodiazepine Screen        Methadone Screen        Phencyclidine Screen        Opiates Screen        THC Screen        Cocaine Screen        Propoxyphene Screen        Buprenorphine Screen        Methamphetamine Screen        Oxycodone Screen        Tricyclic Antidepressants Screen                  Legend    ^: Historical                          External Prenatal Results       Pregnancy Outside Results - Transcribed From Office Records - See Scanned Records For Details       Test Value Date Time    ABO  A  04/30/24 0958    Rh  Negative  04/30/24 0958    Antibody Screen  Positive  10/15/24 0941       See Final Results  10/15/24 0941       Negative  04/30/24 0958    Varicella IgG       Rubella  6.02 index 05/10/24 0947    Hgb  9.3 g/dL 10/15/24 0941       11.6 g/dL 05/10/24 0947    Hct  35.9 % 05/10/24 0947    HgB A1c        1h GTT  157 mg/dL 10/15/24 0941    3h GTT Fasting  82 mg/dL 10/29/24 0730    3h GTT 1 hour  186 mg/dL 10/29/24 0730    3h GTT 2 hour  178 mg/dL 10/29/24 0730    3h GTT 3 hour   97 mg/dL 10/29/24 0730    Gonorrhea (discrete)  Negative  05/10/24 0947    Chlamydia (discrete)  Negative  05/10/24 0947    RPR  Non Reactive  10/15/24 0941       Non Reactive  05/10/24 0947    Syphils cascade: TP-Ab (FTA)       TP-Ab       TP-Ab (EIA)       TPPA       HBsAg   Negative  05/10/24 0947    Herpes Simplex Virus PCR       Herpes Simplex VIrus Culture       HIV  Non Reactive  05/10/24 0947    Hep C RNA Quant PCR       Hep C Antibody  Non Reactive  05/10/24 0947    AFP       NIPT       Cystic Fibroisis        Group B Strep       GBS Susceptibility to Clindamycin       GBS Susceptibility to Erythromycin       Fetal Fibronectin       Genetic Testing, Maternal Blood                 Drug Screening       Test Value Date Time    Urine Drug Screen       Amphetamine Screen       Barbiturate Screen       Benzodiazepine Screen       Methadone Screen       Phencyclidine Screen       Opiates Screen       THC Screen       Cocaine Screen       Propoxyphene Screen       Buprenorphine Screen       Methamphetamine Screen       Oxycodone Screen       Tricyclic Antidepressants Screen                 Legend    ^: Historical                             Past OB History:     OB History    Para Term  AB Living   3 2 2 0 0 2   SAB IAB Ectopic Molar Multiple Live Births   0 0 0 0 0 2      # Outcome Date GA Lbr Tito/2nd Weight Sex Type Anes PTL Lv   3 Current            2 Term      Vag-Spont   NIGEL   1 Term      Vag-Spont   NIGEL       Past Medical History: Past Medical History:   Diagnosis Date    Basal cell carcinoma of lower eyelid, right     Gestational diabetes       Past Surgical History Past Surgical History:   Procedure Laterality Date    AUGMENTATION MAMMAPLASTY Bilateral 2004    BREAST BIOPSY        Family History: Family History   Problem Relation Age of Onset    Hypertension Father     Breast cancer Paternal Grandmother       Social History:  reports that she has never smoked. She has never used smokeless tobacco.   reports that she does not currently use alcohol.   reports no history of drug use.           High Risk Medical Conditions/Complaints this visit: GDM, AMA, complete previa, polyhydramnios    Discussion of Management or Test Interpretation with physician/provider/healthcare  provider: yes    External Records Reviewed: Prenatal history in [facility] from [facility] provider - PEDRO Dacosta reviewed, pregnancy complicated by: see above    Review Previous Test Results: Prenatal labs in [facility] reviewed, history of: diabetes, anemia    Independent Historian: None    Social Determinants to Health: No drug, transportation or housing or other issues noted       Objective   Medical Decision Making:  Amount/Complexity of Data Reviewed  -Labs ordered - full admission panel  -Imaging ordered - n/a  -IV fluids given - yes  Risk:  Prescription drug management  Drug therapy requiring intensive monitoring for toxicity - magnesium, betamethasone  Decision to admit patient - yes  Diagnosis/Treatment limited by social determinants    Min/max vitals past 24 hours:  Temp  Min: 98.2 °F (36.8 °C)  Max: 98.2 °F (36.8 °C)   BP  Min: 137/74  Max: 137/74   Pulse  Min: 82  Max: 91   Resp  Min: 18  Max: 18        Independent Interpretation NST/FHT's: reassuring and category 1.  external monitors used   Cervix: was checked (by me): 1.5 cm / 50 % / -3   Contractions:    Review of current test: results regular    pending       Final Diagnoses: third trimester hemorrhage with known complete previa       Assessment   IUP at 32w4d  Third trimester hemorrhage  Complete previa  Contractions  GDM  Polyhydramnios  AMA     Plan   Admit  Notified private MD  Magnesium/Steroids      Hamida Rouse MD  11/11/2024  02:13 CST

## 2024-11-11 NOTE — CONSULTS
Prenatal Consult    Referring OB:  [place]  Principal Problem:    Antepartum hemorrhage from placenta previa    Potential for  delivery   Assessment: Mother presented to LDR with vaginal bleed (complete placenta previa) at 32w4d.     Reason for Consultation: potential premature single at 32 weeks gestation    Maternal History:   First last name is a 39 y.o. yr/o  with:  complete placenta previa .  The  EDC is  Estimated Date of Delivery: Estimated Date of Delivery: 25    Consult:  father and mother available for discussion.  We reviewed the fetal and  aspects of the above conditions to include: estimated survival rate, estimated intact survival rate, respiratory distress syndrome, beneficial effects of steroids, intraventricular hemorrhage, necrotizing enterocolitis, late onset sepsis, apnea of prematurity, feeding difficulty, temperature instability, jaundice, hypoglycemia, and ELOS, kangaroo care, visitation policy.     Estimated length of stay: WANDA  Discussed the importance of providing human milk for pre-term and late pre-term infants: yes  Reviewed policies and procedures for NICU/ICN  Reviewed structure and function of ULP-Neonatology     We will plan to attend delivery when requested.    Plan for  resuscitation: full resuscitation    Additional comments: All questions answered at this time. Offered to come back at any point and answer any questions.     Thank you for allowing us to participate in the care of your patient.     Gretchen Casey, APRN  24  15:21 CST      50 minutes were spent in consultation, greater than 75% face to face time.

## 2024-11-11 NOTE — CONSULTS
MFM consult    Patient known to me from earlier US  40yo   Two prior vaginal deliveries  Currently 32 4/7 weeks  Diet controlled GDM    Posterior placenta previa. (No signs of accreta on US).   Had first bleeding episode last night. Was awoken from sleep. Filled 3 pads. Blood did stain her thighs.   Since admission, has not had further signs of active bleeding.   NICU    Currently with some occ contractions on toco. Not felt by Sonia  Overall feeling ok  Consistent fetal movement  FHT: cat I  Received first dose of BMTZ this morning.   Abd: NT, no g/r    Bedside US by me  Vertex presentation  Posterior previa again visualized.   No subchorionic collections visualized.   Mild polyhydramnios persists, MVP 8.2cm    EFW from US  2567g (>97%)    Situation discussed with the family.     Currently mom/fetus stable, previa post first bleed   A1GDM    Based upon her current stability an lack of signs of PTL, rec d/c the magnesium at this time  For second dose of BMTZ at 24hrs  Would check her BS fasting and 1hr PP. Would cover the probable elevation in her BS (secondary to the steroids) with a sliding scale insulin to keep her BS <160.     Needs inpatient observation for now secondary to concern for further bleeding.   If she has another significant bleeding episode, recommend delivery.     If remains without another bleeding episode, would be reasonable to shift to outpatient status, possibly as early as Thursday. However, if she has any signs of further (new) bleeding even if small amounts, recommend inpatient management until delivery.   Questions answered for the family.   Will follow with you    Based upon the views of the placenta last week, I would predict the previa to persist. Delivery would then be recommended at 36 weeks, if not sooner as dictated by further bleeding.       All questions answered. Time on floor/unit for chart review, charting, patient evaluation, discussion and coordination of care  was >50 minutes with >50% in face to face counseling and coordination of care on the issues as listed above.     As always, if there are any questions/concerns, please do not hesitate to contact me.   Thanks  Gary  [phone]

## 2024-11-11 NOTE — PAYOR COMM NOTE
"REF:   [id number]    [facility]  [place],  [phone]  OR   FAX   [phone]    Sonia Armstrong (39 y.o. Female)       Date of Birth   1985    Social Security Number       Address   [address] [place], [place] [address]    Home Phone   [phone]    MRN   [id number]       Tenriism   Advent    Marital Status                               Admission Date   11/11/24    Admission Type   Elective    Admitting Provider   Moises Dacosta MD    Attending Provider   Moises Dacosta MD    Department, Room/Bed   [facility] LABOR DELIVERY, L05/1       Discharge Date       Discharge Disposition       Discharge Destination                                 Attending Provider: Moises Dacosta MD    Allergies: No Known Allergies    Isolation: None   Infection: None   Code Status: CPR    Ht: 170.2 cm (67\")   Wt: 90.7 kg (200 lb)    Admission Cmt: None   Principal Problem: Antepartum hemorrhage from placenta previa [O44.10]                   Active Insurance as of 11/11/2024       Primary Coverage       Payor Plan Insurance Group Employer/Plan Group    [employer] PPO [id number]       Payor Plan Address Payor Plan Phone Number Payor Plan Fax Number Effective Dates    [address] [phone]  4/14/2023 - None Entered    [place] [address]         Subscriber Name Subscriber Birth Date Member ID       HOLLIE ARMSTRONG 1985 [id number]                     Emergency Contacts        (Rel.) Home Phone Work Phone Mobile Phone    Hollie Armstrong (Spouse) [phone] -- --        EDC 1/2/2025  G-3 P-2   32/4 GESTATIONAL AGE          History & Physical        Moises Dacosta MD at 11/11/24 0757          H&P updated. The patient was examined and bleeding has stopped. She is aware of non-painful cramping. Good fetal movement. Will continue Magnesium sulfate for now, BMZ #2 tonight, and MFM consult later today. Discussed " "observation until no bleeding for at least 24 hours.     Electronically signed by Moises Dacosta MD at 24 0802   Source Note: H&P (View-Only)           Kwadwo Armstrong  : 1985  MRN: [id number]  CSN: [id number]    Labor & Delivery [facility]ITT[facility] progress note    Subjective  Chief Complaint: She reports bleeding at home, \"filling 3 pads at home\". Perineum with significant blood staining, and small clots noted. Gentle exterior cervical exam with probable 1-2cm dilation and 50% effacement. Known complete previa    HPI: AMA, Gestational diabetes, Polyhydramnios    History:    Prenatal Information:  Prenatal Results       Initial Prenatal Labs       Test Value Reference Range Date Time    Hemoglobin  11.6 g/dL 11.1 - 15.9 05/10/24 0947    Hematocrit  35.9 % 34.0 - 46.6 05/10/24 0947    Platelets  315 x10E3/uL 150 - 450 05/10/24 0947    Rubella IgG  6.02 index Immune >0.99 05/10/24 0947    Hepatitis B SAg  Negative  Negative 05/10/24 0947    Hepatitis C Ab  Non Reactive  Non Reactive 05/10/24 0947    RPR  Non Reactive  Non Reactive 10/15/24 0941       Non Reactive  Non Reactive 05/10/24 0947    T. Pallidum Ab         ABO  A   24 0958    Rh  Negative   24 0958    Antibody Screen  Negative  Negative 24 0958    HIV  Non Reactive  Non Reactive 05/10/24 0947    Urine Culture  Final report   05/10/24 0947    Gonorrhea  Negative  Negative 05/10/24 0947    Chlamydia  Negative  Negative 05/10/24 0947    TSH        HgB A1c         Varicella IgG        Hemoglobinopathy Fractionation        Hemoglobinopathy (genetic testing)        Cystic fibrosis                   Fetal testing        Test Value Reference Range Date Time    NIPT        MSAFP        AFP-4                  2nd and 3rd Trimester       Test Value Reference Range Date Time    Hemoglobin (repeated)  9.3 g/dL 11.1 - 15.9 10/15/24 0941    Hematocrit (repeated)        Platelets   315 x10E3/uL 150 - 450 05/10/24 0947    1 hour GTT   " 157 mg/dL 70 - 139 10/15/24 0941    Antibody Screen (repeated)  Positive  Negative 10/15/24 0941       See Final Results  Negative 10/15/24 0941    3rd TM syphilis scrn (repeated)  RPR   Non Reactive  Non Reactive 10/15/24 0941    3rd TM syphilis scrn (repeated) TP-Ab        3rd TM syphilis screen TB-Ab (FTA)        Syphilis cascade test TP-Ab (EIA)        Syphilis cascade TPPA        GTT Fasting  82 mg/dL 70 - 94 10/29/24 0730    GTT 1 Hr  186 mg/dL 70 - 179 10/29/24 0730    GTT 2 Hr  178 mg/dL 70 - 154 10/29/24 0730    GTT 3 Hr  97 mg/dL 70 - 139 10/29/24 0730    Group B Strep                  Other testing        Test Value Reference Range Date Time    Parvo IgG         CMV IgG                   Drug Screening       Test Value Reference Range Date Time    Amphetamine Screen        Barbiturate Screen        Benzodiazepine Screen        Methadone Screen        Phencyclidine Screen        Opiates Screen        THC Screen        Cocaine Screen        Propoxyphene Screen        Buprenorphine Screen        Methamphetamine Screen        Oxycodone Screen        Tricyclic Antidepressants Screen                  Legend    ^: Historical                          External Prenatal Results       Pregnancy Outside Results - Transcribed From Office Records - See Scanned Records For Details       Test Value Date Time    ABO  A  04/30/24 0958    Rh  Negative  04/30/24 0958    Antibody Screen  Positive  10/15/24 0941       See Final Results  10/15/24 0941       Negative  04/30/24 0958    Varicella IgG       Rubella  6.02 index 05/10/24 0947    Hgb  9.3 g/dL 10/15/24 0941       11.6 g/dL 05/10/24 0947    Hct  35.9 % 05/10/24 0947    HgB A1c        1h GTT  157 mg/dL 10/15/24 0941    3h GTT Fasting  82 mg/dL 10/29/24 0730    3h GTT 1 hour  186 mg/dL 10/29/24 0730    3h GTT 2 hour  178 mg/dL 10/29/24 0730    3h GTT 3 hour   97 mg/dL 10/29/24 0730    Gonorrhea (discrete)  Negative  05/10/24 0947    Chlamydia (discrete)  Negative   05/10/24 0947    RPR  Non Reactive  10/15/24 0941       Non Reactive  05/10/24 0947    Syphils cascade: TP-Ab (FTA)       TP-Ab       TP-Ab (EIA)       TPPA       HBsAg  Negative  05/10/24 0947    Herpes Simplex Virus PCR       Herpes Simplex VIrus Culture       HIV  Non Reactive  05/10/24 0947    Hep C RNA Quant PCR       Hep C Antibody  Non Reactive  05/10/24 0947    AFP       NIPT       Cystic Fibroisis        Group B Strep       GBS Susceptibility to Clindamycin       GBS Susceptibility to Erythromycin       Fetal Fibronectin       Genetic Testing, Maternal Blood                 Drug Screening       Test Value Date Time    Urine Drug Screen       Amphetamine Screen       Barbiturate Screen       Benzodiazepine Screen       Methadone Screen       Phencyclidine Screen       Opiates Screen       THC Screen       Cocaine Screen       Propoxyphene Screen       Buprenorphine Screen       Methamphetamine Screen       Oxycodone Screen       Tricyclic Antidepressants Screen                 Legend    ^: Historical                             Past OB History:     OB History    Para Term  AB Living   3 2 2 0 0 2   SAB IAB Ectopic Molar Multiple Live Births   0 0 0 0 0 2      # Outcome Date GA Lbr Tito/2nd Weight Sex Type Anes PTL Lv   3 Current            2 Term      Vag-Spont   NIGEL   1 Term      Vag-Spont   NIGEL       Past Medical History: Past Medical History:   Diagnosis Date    Basal cell carcinoma of lower eyelid, right     Gestational diabetes       Past Surgical History Past Surgical History:   Procedure Laterality Date    AUGMENTATION MAMMAPLASTY Bilateral 2004    BREAST BIOPSY        Family History: Family History   Problem Relation Age of Onset    Hypertension Father     Breast cancer Paternal Grandmother       Social History:  reports that she has never smoked. She has never used smokeless tobacco.   reports that she does not currently use alcohol.   reports no history of drug use.           High Risk  "Medical Conditions/Complaints this visit: GDM, AMA, complete previa, polyhydramnios    Discussion of Management or Test Interpretation with physician/provider/healthcare provider: yes    External Records Reviewed: Prenatal history in [facility] from [facility] provider - PEDRO Dacosta reviewed, pregnancy complicated by: see above    Review Previous Test Results: Prenatal labs in [facility] reviewed, history of: diabetes, anemia    Independent Historian: None    Social Determinants to Health: No drug, transportation or housing or other issues noted       Objective  Medical Decision Making:  Amount/Complexity of Data Reviewed  -Labs ordered - full admission panel  -Imaging ordered - n/a  -IV fluids given - yes  Risk:  Prescription drug management  Drug therapy requiring intensive monitoring for toxicity - magnesium, betamethasone  Decision to admit patient - yes  Diagnosis/Treatment limited by social determinants    Min/max vitals past 24 hours:  Temp  Min: 98.2 °F (36.8 °C)  Max: 98.2 °F (36.8 °C)   BP  Min: 137/74  Max: 137/74   Pulse  Min: 82  Max: 91   Resp  Min: 18  Max: 18        Independent Interpretation NST/FHT's: reassuring and category 1.  external monitors used   Cervix: was checked (by me): 1.5 cm / 50 % / -3   Contractions:    Review of current test: results regular    pending       Final Diagnoses: third trimester hemorrhage with known complete previa       Assessment  IUP at 32w4d  Third trimester hemorrhage  Complete previa  Contractions  GDM  Polyhydramnios  AMA     Plan  Admit  Notified private MD  Magnesium/Steroids      Hamida Rouse MD  2024  02:13 CST       Electronically signed by Hamida Rouse MD at 24 0213                 Hamida Rouse MD at 24 0155           [place]  Sonia Armstrong  : 1985  MRN: [id number]  CSN: [id number]    Labor & Delivery [facility] progress note    Subjective  Chief Complaint: She reports bleeding at home, \"filling 3 pads at " "home\". Perineum with significant blood staining, and small clots noted. Gentle exterior cervical exam with probable 1-2cm dilation and 50% effacement. Known complete previa    HPI: AMA, Gestational diabetes, Polyhydramnios    History:    Prenatal Information:  Prenatal Results       Initial Prenatal Labs       Test Value Reference Range Date Time    Hemoglobin  11.6 g/dL 11.1 - 15.9 05/10/24 0947    Hematocrit  35.9 % 34.0 - 46.6 05/10/24 0947    Platelets  315 x10E3/uL 150 - 450 05/10/24 0947    Rubella IgG  6.02 index Immune >0.99 05/10/24 0947    Hepatitis B SAg  Negative  Negative 05/10/24 0947    Hepatitis C Ab  Non Reactive  Non Reactive 05/10/24 0947    RPR  Non Reactive  Non Reactive 10/15/24 0941       Non Reactive  Non Reactive 05/10/24 0947    T. Pallidum Ab         ABO  A   04/30/24 0958    Rh  Negative   04/30/24 0958    Antibody Screen  Negative  Negative 04/30/24 0958    HIV  Non Reactive  Non Reactive 05/10/24 0947    Urine Culture  Final report   05/10/24 0947    Gonorrhea  Negative  Negative 05/10/24 0947    Chlamydia  Negative  Negative 05/10/24 0947    TSH        HgB A1c         Varicella IgG        Hemoglobinopathy Fractionation        Hemoglobinopathy (genetic testing)        Cystic fibrosis                   Fetal testing        Test Value Reference Range Date Time    NIPT        MSAFP        AFP-4                  2nd and 3rd Trimester       Test Value Reference Range Date Time    Hemoglobin (repeated)  9.3 g/dL 11.1 - 15.9 10/15/24 0941    Hematocrit (repeated)        Platelets   315 x10E3/uL 150 - 450 05/10/24 0947    1 hour GTT   157 mg/dL 70 - 139 10/15/24 0941    Antibody Screen (repeated)  Positive  Negative 10/15/24 0941       See Final Results  Negative 10/15/24 0941    3rd TM syphilis scrn (repeated)  RPR   Non Reactive  Non Reactive 10/15/24 0941    3rd TM syphilis scrn (repeated) TP-Ab        3rd TM syphilis screen TB-Ab (FTA)        Syphilis cascade test TP-Ab (EIA)        " Syphilis cascade TPPA        GTT Fasting  82 mg/dL 70 - 94 10/29/24 0730    GTT 1 Hr  186 mg/dL 70 - 179 10/29/24 0730    GTT 2 Hr  178 mg/dL 70 - 154 10/29/24 0730    GTT 3 Hr  97 mg/dL 70 - 139 10/29/24 0730    Group B Strep                  Other testing        Test Value Reference Range Date Time    Parvo IgG         CMV IgG                   Drug Screening       Test Value Reference Range Date Time    Amphetamine Screen        Barbiturate Screen        Benzodiazepine Screen        Methadone Screen        Phencyclidine Screen        Opiates Screen        THC Screen        Cocaine Screen        Propoxyphene Screen        Buprenorphine Screen        Methamphetamine Screen        Oxycodone Screen        Tricyclic Antidepressants Screen                  Legend    ^: Historical                          External Prenatal Results       Pregnancy Outside Results - Transcribed From Office Records - See Scanned Records For Details       Test Value Date Time    ABO  A  04/30/24 0958    Rh  Negative  04/30/24 0958    Antibody Screen  Positive  10/15/24 0941       See Final Results  10/15/24 0941       Negative  04/30/24 0958    Varicella IgG       Rubella  6.02 index 05/10/24 0947    Hgb  9.3 g/dL 10/15/24 0941       11.6 g/dL 05/10/24 0947    Hct  35.9 % 05/10/24 0947    HgB A1c        1h GTT  157 mg/dL 10/15/24 0941    3h GTT Fasting  82 mg/dL 10/29/24 0730    3h GTT 1 hour  186 mg/dL 10/29/24 0730    3h GTT 2 hour  178 mg/dL 10/29/24 0730    3h GTT 3 hour   97 mg/dL 10/29/24 0730    Gonorrhea (discrete)  Negative  05/10/24 0947    Chlamydia (discrete)  Negative  05/10/24 0947    RPR  Non Reactive  10/15/24 0941       Non Reactive  05/10/24 0947    Syphils cascade: TP-Ab (FTA)       TP-Ab       TP-Ab (EIA)       TPPA       HBsAg  Negative  05/10/24 0947    Herpes Simplex Virus PCR       Herpes Simplex VIrus Culture       HIV  Non Reactive  05/10/24 0947    Hep C RNA Quant PCR       Hep C Antibody  Non Reactive  05/10/24  0947    AFP       NIPT       Cystic Fibroisis        Group B Strep       GBS Susceptibility to Clindamycin       GBS Susceptibility to Erythromycin       Fetal Fibronectin       Genetic Testing, Maternal Blood                 Drug Screening       Test Value Date Time    Urine Drug Screen       Amphetamine Screen       Barbiturate Screen       Benzodiazepine Screen       Methadone Screen       Phencyclidine Screen       Opiates Screen       THC Screen       Cocaine Screen       Propoxyphene Screen       Buprenorphine Screen       Methamphetamine Screen       Oxycodone Screen       Tricyclic Antidepressants Screen                 Legend    ^: Historical                             Past OB History:     OB History    Para Term  AB Living   3 2 2 0 0 2   SAB IAB Ectopic Molar Multiple Live Births   0 0 0 0 0 2      # Outcome Date GA Lbr Tito/2nd Weight Sex Type Anes PTL Lv   3 Current            2 Term      Vag-Spont   NIGEL   1 Term      Vag-Spont   NIGEL       Past Medical History: Past Medical History:   Diagnosis Date    Basal cell carcinoma of lower eyelid, right     Gestational diabetes       Past Surgical History Past Surgical History:   Procedure Laterality Date    AUGMENTATION MAMMAPLASTY Bilateral 2004    BREAST BIOPSY        Family History: Family History   Problem Relation Age of Onset    Hypertension Father     Breast cancer Paternal Grandmother       Social History:  reports that she has never smoked. She has never used smokeless tobacco.   reports that she does not currently use alcohol.   reports no history of drug use.           High Risk Medical Conditions/Complaints this visit: GDM, AMA, complete previa, polyhydramnios    Discussion of Management or Test Interpretation with physician/provider/healthcare provider: yes    External Records Reviewed: Prenatal history in [facility] from [facility] provider - PEDRO Dacosta reviewed, pregnancy complicated by: see above    Review Previous Test Results:  Prenatal labs in [facility] reviewed, history of: diabetes, anemia    Independent Historian: None    Social Determinants to Health: No drug, transportation or housing or other issues noted       Objective  Medical Decision Making:  Amount/Complexity of Data Reviewed  -Labs ordered - full admission panel  -Imaging ordered - n/a  -IV fluids given - yes  Risk:  Prescription drug management  Drug therapy requiring intensive monitoring for toxicity - magnesium, betamethasone  Decision to admit patient - yes  Diagnosis/Treatment limited by social determinants    Min/max vitals past 24 hours:  Temp  Min: 98.2 °F (36.8 °C)  Max: 98.2 °F (36.8 °C)   BP  Min: 137/74  Max: 137/74   Pulse  Min: 82  Max: 91   Resp  Min: 18  Max: 18        Independent Interpretation NST/FHT's: reassuring and category 1.  external monitors used   Cervix: was checked (by me): 1.5 cm / 50 % / -3   Contractions:    Review of current test: results regular    pending       Final Diagnoses: third trimester hemorrhage with known complete previa       Assessment  IUP at 32w4d  Third trimester hemorrhage  Complete previa  Contractions  GDM  Polyhydramnios  AMA     Plan  Admit  Notified private MD  Magnesium/Steroids      Hamida Rouse MD  11/11/2024  02:13 CST       Electronically signed by Hamida Rouse MD at 11/11/24 0213       Vital Signs (last 2 days)       Date/Time Temp Temp src Pulse Resp BP Patient Position SpO2    11/11/24 1100 -- -- 96 16 130/70 Lying 100    11/11/24 1000 -- -- 90 16 139/81 Lying 100    11/11/24 0900 -- -- 83 16 119/66 Lying 98    11/11/24 0800 97.8 (36.6) Oral 89 16 -- -- 99    11/11/24 0701 -- -- 90 -- 120/72 -- --    11/11/24 0700 -- -- 86 16 -- -- 98    11/11/24 0600 -- -- 91 15 122/68 -- 98    11/11/24 0501 -- -- 90 -- 120/76 -- --    11/11/24 0500 -- -- 88 16 -- -- 98    11/11/24 0430 -- -- 90 -- 117/67 -- --    11/11/24 0400 98.1 (36.7) Oral 87 15 -- -- 98    11/11/24 0300 -- -- 85 16 122/70 Lying 98     11/11/24 0236 -- -- 84 -- 126/77 -- 99    11/11/24 0231 -- -- 91 -- 119/67 -- --    11/11/24 0230 -- -- 88 -- -- -- 99    11/11/24 0228 -- -- 93 -- 123/71 -- --    11/11/24 0226 -- -- 87 -- -- -- 99    11/11/24 0224 -- -- 91 -- 130/75 -- --    11/11/24 0221 -- -- 87 -- -- -- 98    11/11/24 0216 -- -- 90 -- -- -- 100    11/11/24 0210 -- -- -- 16 -- Sitting --    11/11/24 0206 -- -- 86 -- -- -- 100    11/11/24 0201 -- -- 82 -- -- -- 100    11/11/24 0156 -- -- 84 -- -- -- 100 11/11/24 0151 -- -- 91 -- -- -- 100 11/11/24 0146 -- -- 86 -- -- -- 100 11/11/24 0141 -- -- 91 -- -- -- 100 11/11/24 0136 -- -- 85 -- -- -- 100 11/11/24 0135 98.2 (36.8) Oral 86 18 137/74 Lying --          Intake & Output (last 2 days)         11/09 0701  11/10 0700 11/10 0701  11/11 0700 11/11 0701  11/12 0700    P.O.  0     I.V.  775     IV Piggyback  100     Total Intake  875     Urine  450 500    Total Output  450 500    Net  +425 -500                 Facility-Administered Medications as of 11/11/2024   Medication Dose Route Frequency Provider Last Rate Last Admin    acetaminophen (TYLENOL) tablet 1,000 mg  1,000 mg Oral Q6H PRN Moises Dacosta MD   1,000 mg at 11/11/24 0943    Betamethasone Sodium Phosphate injection 12 mg  12 mg Intramuscular Q24H Hamida Rouse MD   12 mg at 11/11/24 0150    bisacodyl (DULCOLAX) suppository 10 mg  10 mg Rectal Daily PRN Kiana, Moises NEVAREZ MD        calcium carbonate (TUMS) chewable tablet 500 mg (200 mg elemental)  2 tablet Oral Daily PRN KianaMoises kim MD        docusate sodium (COLACE) capsule 100 mg  100 mg Oral BID PRN Kiana, Moises NEVAREZ MD        [COMPLETED] magnesium sulfate 20 GM/500ML infusion 4 g  4 g Intravenous Once Hamida Rouse MD   Stopped at 11/11/24 0231    ondansetron ODT (ZOFRAN-ODT) disintegrating tablet 8 mg  8 mg Oral Q8H PRN KianaMoises kim MD        Or    ondansetron (ZOFRAN) injection 4 mg  4 mg Intravenous Q8H PRN Kiana,  Moises NEVAREZ MD        [COMPLETED] penicillin G potassium 5 Million Units in sodium chloride 0.9 % 100 mL MBP  5 Million Units Intravenous Once Hamida Rouse MD   5 Million Units at 11/11/24 0300    sodium chloride 0.9 % flush 10 mL  10 mL Intravenous Q12H Moises Dacosta MD        sodium chloride 0.9 % flush 10 mL  10 mL Intravenous PRN Moises Dacosta MD        sodium chloride 0.9 % infusion 40 mL  40 mL Intravenous PRN Moises Dacosta MD         Orders (last 48 hrs)        Start     Ordered    11/11/24 1200  Vital Signs q 4 while awake  Every 4 Hours      Comments: While the patient is awake.    11/11/24 0809    11/11/24 1055  POC Glucose Once  PROCEDURE ONCE        Comments: Complete no more than 45 minutes prior to patient eating      11/11/24 1043    11/11/24 1051  Diet: Regular/House; Fluid Consistency: Thin (IDDSI 0)  Diet Effective Now         11/11/24 1050    11/11/24 1030  Remove de la paz catheter.  Misc Nursing Order (Specify)  Once        Comments: Remove de la paz catheter.    11/11/24 1030    11/11/24 0900  sodium chloride 0.9 % flush 10 mL  Every 12 Hours Scheduled         11/11/24 0809    11/11/24 0900  POC Glucose 4x Daily Fasting & PC  4 Times Daily Fasting & After Meals      Comments: Complete no more than 45 minutes prior to patient eating      11/11/24 0809    11/11/24 0810  Weigh Patient Daily  Daily       11/11/24 0809    11/11/24 0807  Continuous Fetal Monitoring With NST on Admission and Prior to Initiation of Oxytocin.  Per Order Details        Comments: Continuous Fetal Monitoring With NST on Admission & Prior to Initiation of Oxytocin.    11/11/24 0809    11/11/24 0807  NPO Diet NPO Type: Strict NPO  Diet Effective Now,   Status:  Canceled         11/11/24 0809    11/11/24 0806  Admit To Obstetrics Inpatient  Once         11/11/24 0809    11/11/24 0806  Code Status and Medical Interventions: CPR (Attempt to Resuscitate); Full Support  Continuous         11/11/24 0809     "11/11/24 0806  Place Sequential Compression Device  Once         11/11/24 0809    11/11/24 0806  Maintain Sequential Compression Device  Continuous         11/11/24 0809    11/11/24 0806  Notify Provider (Specified)  Continuous        Comments: Open Order Report to View Parameters Requiring Provider Notification    11/11/24 0809    11/11/24 0806  Notify Provider of Tachysystole (Per Hospital Algorithm)  Continuous        Comments: Open Order Report to View Parameters Requiring Provider Notification    11/11/24 0809    11/11/24 0806  Notify Provider if Membranes Ruptured, Bleeding Greater Than 1 Pad Per Hour, Fetal Heart Tone Abnormality or Severe Pain  Continuous        Comments: Open Order Report to View Parameters Requiring Provider Notification    11/11/24 0809    11/11/24 0806  Insert Peripheral IV  Once         11/11/24 0809    11/11/24 0806  Saline Lock & Maintain IV Access  Continuous         11/11/24 0809    11/11/24 0805  sodium chloride 0.9 % flush 10 mL  As Needed         11/11/24 0809    11/11/24 0805  sodium chloride 0.9 % infusion 40 mL  As Needed         11/11/24 0809    11/11/24 0805  calcium carbonate (TUMS) chewable tablet 500 mg (200 mg elemental)  Daily PRN         11/11/24 0809    11/11/24 0805  ondansetron ODT (ZOFRAN-ODT) disintegrating tablet 8 mg  Every 8 Hours PRN        Placed in \"Or\" Linked Group    11/11/24 0809    11/11/24 0805  ondansetron (ZOFRAN) injection 4 mg  Every 8 Hours PRN        Placed in \"Or\" Linked Group    11/11/24 0809    11/11/24 0805  docusate sodium (COLACE) capsule 100 mg  2 Times Daily PRN         11/11/24 0809    11/11/24 0805  bisacodyl (DULCOLAX) suppository 10 mg  Daily PRN         11/11/24 0809    11/11/24 0731  acetaminophen (TYLENOL) tablet 1,000 mg  Every 6 Hours PRN         11/11/24 0731    11/11/24 0702  Inpatient Maternal & Fetal Medicine Consult  IN AM        Specialty:  Maternal and Fetal Medicine  Provider:  (Not yet assigned)    11/11/24 0323    " "11/11/24 0626  penicillin G in iso-osmotic dextrose IVPB 3 million units (premix)  Every 4 Hours,   Status:  Discontinued        Placed in \"Followed by\" Linked Group    11/11/24 0227    11/11/24 0515  lactated ringers infusion  Continuous,   Status:  Discontinued         11/11/24 0416    11/11/24 0355  Antibody Identification  Once         11/11/24 0354    11/11/24 0351  POC Glucose Once  PROCEDURE ONCE        Comments: Complete no more than 45 minutes prior to patient eating      11/11/24 0339    11/11/24 0315  penicillin G potassium 5 Million Units in sodium chloride 0.9 % 100 mL MBP  Once        Placed in \"Followed by\" Linked Group    11/11/24 0227    11/11/24 0300  magnesium sulfate 20 GM/500ML infusion 4 g  Once        Placed in \"Followed by\" Linked Group    11/11/24 0201 11/11/24 0300  magnesium sulfate 20 GM/500ML infusion  Continuous,   Status:  Discontinued        Placed in \"Followed by\" Linked Group    11/11/24 0201    11/11/24 0245  magnesium sulfate 4g/100mL (PREMIX) infusion  Once,   Status:  Discontinued        Placed in \"Followed by\" Linked Group    11/11/24 0155    11/11/24 0245  magnesium sulfate 2g/50 mL (PREMIX) infusion  Continuous,   Status:  Discontinued        Placed in \"Followed by\" Linked Group    11/11/24 0155    11/11/24 0245  magnesium sulfate 20 GM/500ML infusion 4 g  Once,   Status:  Discontinued        Placed in \"Followed by\" Linked Group    11/11/24 0155    11/11/24 0245  magnesium sulfate 20 GM/500ML infusion  Continuous,   Status:  Discontinued        Placed in \"Followed by\" Linked Group    11/11/24 0155    11/11/24 0230  Betamethasone Sodium Phosphate injection 12 mg  Every 24 Hours,   Status:  Discontinued         11/11/24 0140    11/11/24 0230  Betamethasone Sodium Phosphate injection 12 mg  Every 24 Hours         11/11/24 0141 11/11/24 0220  Inpatient Consult to Neonatology  Once        Specialty:  Neonatology  Provider:  (Not yet assigned)    11/11/24 0220 11/11/24 " "0146  D-dimer, Quantitative  Once         24 0145    24 014  CBC & Differential  STAT         24 0145    24 014  Type & Screen  STAT         24 0145    24 014  Treponema pallidum AB w/Reflex RPR  Once         24 0145    24 014  Kleihauer-Betke Stain  Once         24 0145    24 014  aPTT  STAT         24 0145    24 014  Protime-INR  STAT         24 0145    24 014  Fibrinogen  Once         24 0145    24 014  Fibrin Split Products  Once         24 0145    24 014  CBC Auto Differential  PROCEDURE ONCE         24 014    Unscheduled  Position Change - For Intra-Uterine Resusitation for Hypertonus, HyperStimulation or Non-Reassuring Fetal Status  As Needed       24 0809                     Physician Progress Notes (last 48 hours)        Hamida Rouse MD at 24 0155          [facility]  Sonia Armstrong  : 1985  MRN: [id number]  CSN: [id number]    Labor & Delivery BRITTNEY progress note    Subjective   Chief Complaint: She reports bleeding at home, \"filling 3 pads at home\". Perineum with significant blood staining, and small clots noted. Gentle exterior cervical exam with probable 1-2cm dilation and 50% effacement. Known complete previa    HPI: AMA, Gestational diabetes, Polyhydramnios    History:    Prenatal Information:  Prenatal Results       Initial Prenatal Labs       Test Value Reference Range Date Time    Hemoglobin  11.6 g/dL 11.1 - 15.9 05/10/24 0947    Hematocrit  35.9 % 34.0 - 46.6 05/10/24 0947    Platelets  315 x10E3/uL 150 - 450 05/10/24 0947    Rubella IgG  6.02 index Immune >0.99 05/10/24 09    Hepatitis B SAg  Negative  Negative 05/10/24 0947    Hepatitis C Ab  Non Reactive  Non Reactive 05/10/24 0947    RPR  Non Reactive  Non Reactive 10/15/24 0941       Non Reactive  Non Reactive 05/10/24 0947    T. Pallidum Ab         ABO  A   24 0958    Rh  " Negative   04/30/24 0958    Antibody Screen  Negative  Negative 04/30/24 0958    HIV  Non Reactive  Non Reactive 05/10/24 0947    Urine Culture  Final report   05/10/24 0947    Gonorrhea  Negative  Negative 05/10/24 0947    Chlamydia  Negative  Negative 05/10/24 0947    TSH        HgB A1c         Varicella IgG        Hemoglobinopathy Fractionation        Hemoglobinopathy (genetic testing)        Cystic fibrosis                   Fetal testing        Test Value Reference Range Date Time    NIPT        MSAFP        AFP-4                  2nd and 3rd Trimester       Test Value Reference Range Date Time    Hemoglobin (repeated)  9.3 g/dL 11.1 - 15.9 10/15/24 0941    Hematocrit (repeated)        Platelets   315 x10E3/uL 150 - 450 05/10/24 0947    1 hour GTT   157 mg/dL 70 - 139 10/15/24 0941    Antibody Screen (repeated)  Positive  Negative 10/15/24 0941       See Final Results  Negative 10/15/24 0941    3rd TM syphilis scrn (repeated)  RPR   Non Reactive  Non Reactive 10/15/24 0941    3rd TM syphilis scrn (repeated) TP-Ab        3rd TM syphilis screen TB-Ab (FTA)        Syphilis cascade test TP-Ab (EIA)        Syphilis cascade TPPA        GTT Fasting  82 mg/dL 70 - 94 10/29/24 0730    GTT 1 Hr  186 mg/dL 70 - 179 10/29/24 0730    GTT 2 Hr  178 mg/dL 70 - 154 10/29/24 0730    GTT 3 Hr  97 mg/dL 70 - 139 10/29/24 0730    Group B Strep                  Other testing        Test Value Reference Range Date Time    Parvo IgG         CMV IgG                   Drug Screening       Test Value Reference Range Date Time    Amphetamine Screen        Barbiturate Screen        Benzodiazepine Screen        Methadone Screen        Phencyclidine Screen        Opiates Screen        THC Screen        Cocaine Screen        Propoxyphene Screen        Buprenorphine Screen        Methamphetamine Screen        Oxycodone Screen        Tricyclic Antidepressants Screen                  Legend    ^: Historical                          External  Prenatal Results       Pregnancy Outside Results - Transcribed From Office Records - See Scanned Records For Details       Test Value Date Time    ABO  A  24    Rh  Negative  24    Antibody Screen  Positive  10/15/24 0941       See Final Results  10/15/24 0941       Negative  24    Varicella IgG       Rubella  6.02 index 05/10/24 09    Hgb  9.3 g/dL 10/15/24 0941       11.6 g/dL 05/10/24 09    Hct  35.9 % 05/10/24 0947    HgB A1c        1h GTT  157 mg/dL 10/15/24 0941    3h GTT Fasting  82 mg/dL 10/29/24 07    3h GTT 1 hour  186 mg/dL 10/29/24 07    3h GTT 2 hour  178 mg/dL 10/29/24 07    3h GTT 3 hour   97 mg/dL 10/29/24 07    Gonorrhea (discrete)  Negative  05/10/24 0947    Chlamydia (discrete)  Negative  05/10/24 0947    RPR  Non Reactive  10/15/24 0941       Non Reactive  05/10/24 0947    Syphils cascade: TP-Ab (FTA)       TP-Ab       TP-Ab (EIA)       TPPA       HBsAg  Negative  05/10/24 0947    Herpes Simplex Virus PCR       Herpes Simplex VIrus Culture       HIV  Non Reactive  05/10/24 0947    Hep C RNA Quant PCR       Hep C Antibody  Non Reactive  05/10/24 0947    AFP       NIPT       Cystic Fibroisis        Group B Strep       GBS Susceptibility to Clindamycin       GBS Susceptibility to Erythromycin       Fetal Fibronectin       Genetic Testing, Maternal Blood                 Drug Screening       Test Value Date Time    Urine Drug Screen       Amphetamine Screen       Barbiturate Screen       Benzodiazepine Screen       Methadone Screen       Phencyclidine Screen       Opiates Screen       THC Screen       Cocaine Screen       Propoxyphene Screen       Buprenorphine Screen       Methamphetamine Screen       Oxycodone Screen       Tricyclic Antidepressants Screen                 Legend    ^: Historical                             Past OB History:     OB History    Para Term  AB Living   3 2 2 0 0 2   SAB IAB Ectopic Molar Multiple Live Births   0 0  0 0 0 2      # Outcome Date GA Lbr Tito/2nd Weight Sex Type Anes PTL Lv   3 Current            2 Term      Vag-Spont   NIGEL   1 Term      Vag-Spont   NIGEL       Past Medical History: Past Medical History:   Diagnosis Date    Basal cell carcinoma of lower eyelid, right     Gestational diabetes       Past Surgical History Past Surgical History:   Procedure Laterality Date    AUGMENTATION MAMMAPLASTY Bilateral 2004    BREAST BIOPSY        Family History: Family History   Problem Relation Age of Onset    Hypertension Father     Breast cancer Paternal Grandmother       Social History:  reports that she has never smoked. She has never used smokeless tobacco.   reports that she does not currently use alcohol.   reports no history of drug use.           High Risk Medical Conditions/Complaints this visit: GDM, AMA, complete previa, polyhydramnios    Discussion of Management or Test Interpretation with physician/provider/healthcare provider: yes    External Records Reviewed: Prenatal history in [facility] from [facility] [place] OB provider - PEDRO Dacosta reviewed, pregnancy complicated by: see above    Review Previous Test Results: Prenatal labs in [facility] reviewed, history of: diabetes, anemia    Independent Historian: None    Social Determinants to Health: No drug, transportation or housing or other issues noted      Objective   Medical Decision Making:  Amount/Complexity of Data Reviewed  -Labs ordered - full admission panel  -Imaging ordered - n/a  -IV fluids given - yes  Risk:  Prescription drug management  Drug therapy requiring intensive monitoring for toxicity - magnesium, betamethasone  Decision to admit patient - yes  Diagnosis/Treatment limited by social determinants    Min/max vitals past 24 hours:  Temp  Min: 98.2 °F (36.8 °C)  Max: 98.2 °F (36.8 °C)   BP  Min: 137/74  Max: 137/74   Pulse  Min: 82  Max: 91   Resp  Min: 18  Max: 18        Independent Interpretation NST/FHT's: reassuring and category 1.  external monitors used   Cervix:  was checked (by me): 1.5 cm / 50 % / -3   Contractions:    Review of current test: results regular    pending       Final Diagnoses: third trimester hemorrhage with known complete previa      Assessment   IUP at 32w4d  Third trimester hemorrhage  Complete previa  Contractions  GDM  Polyhydramnios  AMA    Plan   Admit  Notified private MD  Magnesium/Steroids      Hamida Rouse MD  2024  02:13 CST      Electronically signed by Hamida Rouse MD at 24 0213          Consult Notes (last 48 hours)        Claudio Parker MD at 24 1014        Consult Orders    1. Inpatient Maternal & Fetal Medicine Consult [[id number]] ordered by Hamida Rouse MD at 24 0323                 MFM consult    Patient known to me from earlier US  40yo   Two prior vaginal deliveries  Currently 32 4/7 weeks  Diet controlled GDM    Posterior placenta previa. (No signs of accreta on US).   Had first bleeding episode last night. Was awoken from sleep. Filled 3 pads. Blood did stain her thighs.   Since admission, has not had further signs of active bleeding.   NICU    Currently with some occ contractions on toco. Not felt by Sonia  Overall feeling ok  Consistent fetal movement  FHT: cat I  Received first dose of BMTZ this morning.   Abd: NT, no g/r    Bedside US by me  Vertex presentation  Posterior previa again visualized.   No subchorionic collections visualized.   Mild polyhydramnios persists, MVP 8.2cm    EFW from US  2567g (>97%)    Situation discussed with the family.     Currently mom/fetus stable, previa post first bleed   A1GDM    Based upon her current stability an lack of signs of PTL, rec d/c the magnesium at this time  For second dose of BMTZ at 24hrs  Would check her BS fasting and 1hr PP. Would cover the probable elevation in her BS (secondary to the steroids) with a sliding scale insulin to keep her BS <160.     Needs inpatient observation for now secondary to  concern for further bleeding.   If she has another significant bleeding episode, recommend delivery.     If remains without another bleeding episode, would be reasonable to shift to outpatient status, possibly as early as Thursday. However, if she has any signs of further (new) bleeding even if small amounts, recommend inpatient management until delivery.   Questions answered for the family.   Will follow with you    Based upon the views of the placenta last week, I would predict the previa to persist. Delivery would then be recommended at 36 weeks, if not sooner as dictated by further bleeding.       All questions answered. Time on floor/unit for chart review, charting, patient evaluation, discussion and coordination of care was >50 minutes with >50% in face to face counseling and coordination of care on the issues as listed above.     As always, if there are any questions/concerns, please do not hesitate to contact me.   Thanks  Gary  [phone]        Electronically signed by Claudio Parker MD at 11/11/24 1030     Labor & Delivery Timeline (11/11/2024 01:32 to 11/11/2024 13:00:01)      11/11/2024 11/11/2024 Event Details User   12:30 OB Triage/Labor Other flowsheet entries  Contraction Frequency (Minutes): occasional Bere Mcwilliams RN   12:00 OB Triage/Labor Other flowsheet entries  Contraction Frequency (Minutes): occasional Bere Mcwilliams RN   11:30 OB Triage/Labor Other flowsheet entries  Contraction Frequency (Minutes): occasional Bere Mcwilliams RN   11:00 Other Flowsheet Documentation Other flowsheet entries  BP: 130/70 (Device Time: 11:01:12)  Heart Rate: 96 (Device Time: 11:00:26)  Resp: 16  SpO2: 100 % (Device Time: 11:00:26)  Contraction Frequency (Minutes): 1-5 Bere Mcwilliams RN   10:54:08 Orders Placed Point of Care Testing  - POC Glucose Once Interface, Poct Results To Beaker   10:53 Medication Not Given sodium chloride 0.9 % flush 10 mL - Dose: 10 mL ; Route: Intravenous ;  Reason: Other ; Scheduled Time: 0900 Bere Mcwilliams RN   10:50:38 Orders Placed Diet  - Diet: Regular/House; Fluid Consistency: Thin (IDDSI 0) Moises Dacosta MD   10:43:33 Specimens Collected POC Glucose Once  - ID: [id number] Type: Blood    10:30 OB Triage/Labor Other flowsheet entries  Contraction Frequency (Minutes): 5-8 Bere Mcwilliams RN   10:30 Orders Placed Nursing  - Remove de la paz catheter.  Misc Nursing Order (Specify) Moises Dacosta MD   10:30 Orders Discontinued magnesium sulfate 20 GM/500ML infusion ; lactated ringers infusion ; NPO Diet NPO Type: Strict NPO (11/11/24 0807) Bere Mcwilliams RN   10:30 Medication Stopped magnesium sulfate 20 GM/500ML infusion - Route: Intravenous ; Line: Peripheral IV 11/11/24 0135 Distal;Left;Posterior Forearm ; Scheduled Time: 1030 Bere Mcwilliams RN   10:26 OB Triage/Labor Other flowsheet entries  Response: See orders  Provider Name: MD Olesya  Notification Route: Phone call Bere Mcwilliams RN   10:00 Other Flowsheet Documentation Other flowsheet entries  BP: 139/81 (Device Time: 10:01:47)  Heart Rate: 90 (Device Time: 10:00:24)  Resp: 16  SpO2: 100 % (Device Time: 10:00:24)  Contraction Frequency (Minutes): 2-8 Bere Mcwilliams RN   09:52 OB Triage/Labor Other flowsheet entries  Provider Name: Dr. Parker  Notification Route: At bedside Bere Mcwilliams RN   09:43 Medication Given acetaminophen (TYLENOL) tablet 1,000 mg - Dose: 1,000 mg ; Route: Oral Bere Mcwilliams RN   09:30 OB Triage/Labor Other flowsheet entries  Contraction Frequency (Minutes): occasional Bere Mcwilliams RN   09:00 Other Flowsheet Documentation Other flowsheet entries  BP: 119/66 (Device Time: 09:01:00)  Heart Rate: 83 (Device Time: 09:00:25)  Resp: 16  SpO2: 98 % (Device Time: 09:00:25)  Contraction Frequency (Minutes): occasional Bere Mcwilliams RN   08:30 OB Triage/Labor Other flowsheet entries  Contraction Frequency (Minutes): 8-12 Bere Mcwilliams, RN    08:09:20 Orders Placed Point of Care Testing  - POC Glucose 4x Daily Fasting & PC  Nursing  - Continuous Fetal Monitoring With NST on Admission and Prior to Initiation of Oxytocin.  Medications  - calcium carbonate (TUMS) chewable tablet 500 mg (200 mg elemental); ondansetron ODT (ZOFRAN-ODT) disintegrating tablet 8 mg; ondansetron (ZOFRAN) injection 4 mg; docusate sodium (COLACE) capsule 100 mg; bisacodyl (DULCOLAX) suppository 10 mg  Diet  - NPO Diet NPO Type: Strict NPO Moises Dacosta MD   08:09:19 Orders Placed Nursing  - Place Sequential Compression Device; Maintain Sequential Compression Device; Vital Signs q 4 while awake; Notify Provider (Specified); Notify Provider of Tachysystole (Per Hospital Algorithm); Notify Provider if Membranes Ruptured, Bleeding Greater Than 1 Pad Per Hour, Fetal Heart Tone Abnormality or Severe Pain; Weigh Patient Daily; Position Change - For Intra-Uterine Resusitation for Hypertonus, HyperStimulation or Non-Reassuring Fetal Status; Saline Lock & Maintain IV Access  Medications  - sodium chloride 0.9 % flush 10 mL; sodium chloride 0.9 % flush 10 mL; sodium chloride 0.9 % infusion 40 mL  IV  - Insert Peripheral IV  Code Status  - Code Status and Medical Interventions: CPR (Attempt to Resuscitate); Full Support  Admission  - Admit To Obstetrics Inpatient Moises Dacosta MD   08:02:39 Orders Discontinued penicillin G in iso-osmotic dextrose IVPB 3 million units (premix) Moises Dacosta MD   08:00 Other Flowsheet Documentation Other flowsheet entries  Temp: 97.8 °F (36.6 °C)  Temp src: Oral  Heart Rate: 89 (Device Time: 08:00:24)  Resp: 16  SpO2: 99 % (Device Time: 08:00:24)  (0-10) Pain Rating: Activity: 0  (0-10) Pain Rating: Rest: 0  Contraction Frequency (Minutes): occasional Oj, Bere E, RN   08:00 Urethral Catheter Non-latex 16 Fr. Assessment Daily Indications: Hourly Output in Critical Unstable Patient requiring Frequent Intervention (hemodynamics,  "titration or life supportive therapy)  Catheter care complete: Yes  Securement Method: Securing device  Collection Container: Standard drainage bag  Site Assessment: Clean; Skin intact Bere Mcwilliams RN   07:48 [REMOVED] Peripheral IV 11/11/24 0135 Distal;Left;Posterior Forearm Removed Removal date: If not present on admission, USE TODAY'S DATE/Removal time: If not present on admission, USE TODAY'S TIME: 11/11/24 0748  Placement date: If unknown, DO NOT use \"Add Comment\" note/Placement time: If unknown, DO NOT use \"Add Comment\" note: 11/11/24 0135   Size (Gauge): 18 G  Orientation: Distal;Left;Posterior  Location: Forearm  Site Prep: Alcohol  Local Anesthetic: None  Technique: Anatomical landmarks  Inserted by: YFN GABRIEL  Total insertion attempts: 1  Patient Tolerance: Tolerated well  Removed : Per patient/family request  Is this a home health patient?: No Bere Mcwilliams RN   07:45 Peripheral IV 11/11/24 0745 Anterior;Right Forearm Placed Placement date: If unknown, DO NOT use \"Add Comment\" note/Placement time: If unknown, DO NOT use \"Add Comment\" note: 11/11/24 0745   Hand Hygiene Completed: Yes  Size (Gauge): 18 G  Orientation: Anterior;Right  Location: Forearm  Site Prep: Alcohol  Local Anesthetic: None  Technique: Anatomical landmarks  Inserted by: ABIMAEL Albrecht  Total insertion attempts: 1  Patient Tolerance: Tolerated well Bere Mcwilliams RN   07:31:45 Orders Placed Medications  - acetaminophen (TYLENOL) tablet 1,000 mg Moises Dacosta MD   07:31 OB Triage/Labor Other flowsheet entries  Response: See orders  Provider Name: Dr. Dacosta  Notification Route: At bedside Bere Mcwilliams RN   07:30 OB Triage/Labor Other flowsheet entries  Contraction Frequency (Minutes): occasional Oj, Bere E, RN   07:24 Medication New Bag penicillin G in iso-osmotic dextrose IVPB 3 million units (premix) - Dose: 3 Million Units ; Route: Intravenous ; Line: Peripheral IV 11/11/24 0135 Distal;Left;Posterior " Forearm ; Scheduled Time: 0630 Bere Mcwilliams RN   07:16 Medication Handoff magnesium sulfate 20 GM/500ML infusion - Dose: 2 g/hr ; Rate: 50 mL/hr ; Route: Intravenous ; Line: Peripheral IV 11/11/24 0135 Distal;Left;Posterior Forearm ; Scheduled Time: 0716 Laquita Nicholas RN   07:01 Vital Signs Other flowsheet entries  BP: 120/72 (Device Time: 07:01:01)  Heart Rate: 90 (Device Time: 07:01:01) Laquita Nicholas RN   07:00 Other Flowsheet Documentation Magnesium Sulfate  Volume (mL) Magnesium : 50 mL  Other flowsheet entries  Heart Rate: 86 (Device Time: 07:00:25)  Resp: 16  SpO2: 98 % (Device Time: 07:00:25)  Contraction Frequency (Minutes): 3-8 Laquita Nicholas RN   07:00 Urethral Catheter Non-latex 16 Fr. Assessment Output (mL): 125 mL Laquita Nicholas RN   07:00 Medication Rate/Dose Verify lactated ringers infusion - Dose: 75 mL/hr ; Rate: 75 mL/hr ; Route: Intravenous ; Line: Peripheral IV 11/11/24 0135 Distal;Left;Posterior Forearm ; Scheduled Time: 0700 Laquita Nicholas RN   07:00 Medication Rate/Dose Verify magnesium sulfate 20 GM/500ML infusion - Dose: 2 g/hr ; Rate: 50 mL/hr ; Route: Intravenous ; Line: Peripheral IV 11/11/24 0135 Distal;Left;Posterior Forearm ; Scheduled Time: 0700 Laquita Nicholas RN   06:30 OB Triage/Labor Other flowsheet entries  Contraction Frequency (Minutes): 2-7 Laquita Nicholas RN   06:00 Other Flowsheet Documentation Magnesium Sulfate  Volume (mL) Magnesium : 50 mL  Other flowsheet entries  BP: 122/68 (Device Time: 06:01:03)  Heart Rate: 91 (Device Time: 06:00:25)  Resp: 15  SpO2: 98 % (Device Time: 06:00:25)  (0-10) Pain Rating: Activity: 0  (0-10) Pain Rating: Rest: 0  Contraction Frequency (Minutes): 2-6 Laquita Nicholas RN   06:00 Urethral Catheter Non-latex 16 Fr. Assessment Output (mL): 100 mL Laquita Nicholas RN   06:00 Medication Rate/Dose Verify lactated ringers infusion - Dose: 75 mL/hr ; Rate: 75 mL/hr ; Route: Intravenous ; Line:  Peripheral IV 11/11/24 0135 Distal;Left;Posterior Forearm ; Scheduled Time: 0600 Laquita Nicholas RN   06:00 Medication Rate/Dose Verify magnesium sulfate 20 GM/500ML infusion - Dose: 2 g/hr ; Rate: 50 mL/hr ; Route: Intravenous ; Line: Peripheral IV 11/11/24 0135 Distal;Left;Posterior Forearm ; Scheduled Time: 0600 Laquita Nicholas RN   05:30 OB Triage/Labor Other flowsheet entries  Contraction Frequency (Minutes): 2-4 Laquita Nicholas RN   05:01 Mag Sulfate Other flowsheet entries  BP: 120/76 (Device Time: 05:00:58)  Heart Rate: 90 (Device Time: 05:00:58) Laquita Nicholas RN   05:00 Other Flowsheet Documentation Magnesium Sulfate  Volume (mL) Magnesium : 50 mL  Other flowsheet entries  Heart Rate: 88 (Device Time: 05:00:24)  Resp: 16  SpO2: 98 % (Device Time: 05:00:24)  (0-10) Pain Rating: Activity: 0  (0-10) Pain Rating: Rest: 0  Contraction Frequency (Minutes): 2-7 Laquita Nicholas RN   05:00 Urethral Catheter Non-latex 16 Fr. Assessment Output (mL): 50 mL Laquita Nicholas RN   05:00 Medication Rate/Dose Verify lactated ringers infusion - Dose: 75 mL/hr ; Rate: 75 mL/hr ; Route: Intravenous ; Line: Peripheral IV 11/11/24 0135 Distal;Left;Posterior Forearm ; Scheduled Time: 0500 Laquita Nicholas RN   05:00 Medication Rate/Dose Verify magnesium sulfate 20 GM/500ML infusion - Dose: 2 g/hr ; Rate: 50 mL/hr ; Route: Intravenous ; Line: Peripheral IV 11/11/24 0135 Distal;Left;Posterior Forearm ; Scheduled Time: 0500 Laquita Nicholas RN   04:30 Other Flowsheet Documentation Other flowsheet entries  BP: 117/67 (Device Time: 04:30:19)  Heart Rate: 90 (Device Time: 04:30:25)  Contraction Frequency (Minutes): 6-8 Laquita Nicholas RN   04:16:59 Orders Placed Medications  - lactated ringers infusion Hamida Rouse MD   04:00 Other Flowsheet Documentation Magnesium Sulfate  Volume (mL) Magnesium : 50 mL  Other flowsheet entries  Temp: 98.1 °F (36.7 °C)  Temp src: Oral  Heart Rate:  87 (Device Time: 04:00:25)  Resp: 15  SpO2: 98 % (Device Time: 04:00:25)  (0-10) Pain Rating: Activity: 0  (0-10) Pain Rating: Rest: 0  Contraction Frequency (Minutes): 6-11 Laquita Nicholas RN   04:00 Urethral Catheter Non-latex 16 Fr. Assessment Output (mL): 125 mL Laquita Nicholas RN   04:00 Medication Rate/Dose Verify lactated ringers infusion - Dose: 75 mL/hr ; Rate: 75 mL/hr ; Route: Intravenous ; Line: Peripheral IV 11/11/24 0135 Distal;Left;Posterior Forearm ; Scheduled Time: 0400 Laquita Nicholas RN   04:00 Medication Rate/Dose Verify magnesium sulfate 20 GM/500ML infusion - Dose: 2 g/hr ; Rate: 50 mL/hr ; Route: Intravenous ; Line: Peripheral IV 11/11/24 0135 Distal;Left;Posterior Forearm ; Scheduled Time: 0400 Laquita Nicholas RN   03:54:29 Orders Placed Blood Bank  - Antibody Identification Hamida Rouse MD   03:50:54 Orders Placed Point of Care Testing  - POC Glucose Once Interface, Poct Results To Beaker   03:39:56 Specimens Collected POC Glucose Once  - ID: [id number] Type: Blood    03:30 OB Triage/Labor Other flowsheet entries  Contraction Frequency (Minutes): 7-11 Laquita Nicholas RN   03:23:51 Orders Placed Consult  - Inpatient Maternal & Fetal Medicine Consult Hamida Rouse MD   03:00 Other Flowsheet Documentation Magnesium Sulfate  Volume (mL) Magnesium : 50 mL  Other flowsheet entries  BP: 122/70  Heart Rate: 85 (Device Time: 03:00:25)  Resp: 16  SpO2: 98 % (Device Time: 03:00:25)  (0-10) Pain Rating: Activity: 0  (0-10) Pain Rating: Rest: 0  Contraction Frequency (Minutes): 1.5-8 Laquita Nicholas RN   03:00 Urethral Catheter Non-latex 16 Fr. Assessment Output (mL): 50 mL Laquita Nicholas RN   03:00 Medication Rate/Dose Verify lactated ringers infusion - Dose: 75 mL/hr ; Rate: 75 mL/hr ; Route: Intravenous ; Line: Peripheral IV 11/11/24 0135 Distal;Left;Posterior Forearm ; Scheduled Time: 0300 Laquita Nicholas RN   03:00 Medication Given  "penicillin G potassium 5 Million Units in sodium chloride 0.9 % 100 mL MBP - Dose: 5 Million Units ; Route: Intravenous ; Line: Peripheral IV 11/11/24 0135 Distal;Left;Posterior Forearm ; Scheduled Time: 0315 Laquita Nicholas RN   03:00 Medication Rate/Dose Verify magnesium sulfate 20 GM/500ML infusion - Dose: 2 g/hr ; Rate: 50 mL/hr ; Route: Intravenous ; Line: Peripheral IV 11/11/24 0135 Distal;Left;Posterior Forearm ; Scheduled Time: 0300 Laquita Nicholas RN   02:40 [REMOVED] Urethral Catheter Non-latex 16 Fr. Removed Removal date: If not present on admission, USE TODAY'S DATE/Removal time: If not present on admission, USE TODAY'S TIME: 11/11/24 0240  Placement date: If unknown, DO NOT use \"Add Comment\" note/Placement time: If unknown, DO NOT use \"Add Comment\" note: 11/11/24 0200   Indications: Selected surgeries ( tract, abdomen)  Inserted by: MICAH Nicholas RN  Number Of Insertion Attempts: 1  Hand Hygiene Completed: Yes  Catheter Type: Non-latex  Tube Size (Fr.): 16 Fr.  Catheter Balloon Size: 10 mL  Cleansed with: Perineal hygiene wipe  Urine Returned: Yes  Removal Reason : (c) Laquita Nicholas RN   02:40 Patient Admitted to L&D  Lauqita Nicholas RN   02:40 Patient Transferred From: \[facility]\"" ; bed 1 -- To: [facility] ; bed 1 Laquita Nicholas RN   02:36 Vital Signs Other flowsheet entries  BP: 126/77 (Device Time: 02:36:10)  Heart Rate: 84 (Device Time: 02:35:35)  SpO2: 99 % (Device Time: 02:35:35) Laquita Nicholas RN   02:31 Vital Signs Other flowsheet entries  BP: 119/67 (Device Time: 02:31:15)  Heart Rate: 91 (Device Time: 02:31:15) Ania Tamez RN   02:31 Medication New Bag magnesium sulfate 20 GM/500ML infusion - Dose: 2 g/hr ; Rate: 50 mL/hr ; Route: Intravenous ; Line: Peripheral IV 11/11/24 0135 Distal;Left;Posterior Forearm ; Scheduled Time: 0300 Laquita Nicholas, RN   02:31 Medication Stopped magnesium sulfate 20 GM/500ML infusion 4 g - Route: Intravenous ; Line: Peripheral IV " 11/11/24 0135 Distal;Left;Posterior Forearm ; Scheduled Time: 0231 Laquita Nicholas RN   02:30 Vital Signs Other flowsheet entries  Heart Rate: 88 (Device Time: 02:30:35)  SpO2: 99 % (Device Time: 02:30:35) Ania Tamez RN   02:30 OB Triage/Labor Other flowsheet entries  Contraction Frequency (Minutes): occasional Yu, Laquita, RN   02:28 Vital Signs Other flowsheet entries  BP: 123/71 (Device Time: 02:27:51)  Heart Rate: 93 (Device Time: 02:27:51) Ania Tamez RN   02:27:20 Orders Placed Medications  - penicillin G potassium 5 Million Units in sodium chloride 0.9 % 100 mL MBP; penicillin G in iso-osmotic dextrose IVPB 3 million units (premix) Hamida Rouse MD   02:26 Vital Signs Other flowsheet entries  Heart Rate: 87 (Device Time: 02:25:35)  SpO2: 99 % (Device Time: 02:25:35) Ania Tamez RN   02:24 Vital Signs Other flowsheet entries  BP: 130/75 (Device Time: 02:24:05)  Heart Rate: 91 (Device Time: 02:24:05) Ania Tamez RN   02:21 Vital Signs Other flowsheet entries  Heart Rate: 87 (Device Time: 02:20:35)  SpO2: 98 % (Device Time: 02:20:35) Ania Tamez RN   02:20:16 Orders Placed Consult  - Inpatient Consult to Neonatology Hamida Rouse MD   02:16 Vital Signs Other flowsheet entries  Heart Rate: 90 (Device Time: 02:15:35)  SpO2: 100 % (Device Time: 02:15:35) Ania Tamez RN   02:10 Mag Sulfate Other flowsheet entries  Resp: 16  Contraction Frequency (Minutes): occasional Yu, Laquita, RN   02:06 Vital Signs Other flowsheet entries  Heart Rate: 86 (Device Time: 02:05:36)  SpO2: 100 % (Device Time: 02:05:36) Ania Tamez RN   02:06 Medication New Bag magnesium sulfate 20 GM/500ML infusion 4 g - Dose: 4 g ; Route: Intravenous ; Line: Peripheral IV 11/11/24 0135 Distal;Left;Posterior Forearm ; Scheduled Time: 0300 ; Linked override order: magnesium sulfate 20 GM/500ML infusion  - ADS Override Ania Lai RN   02:04  "Urethral Catheter Non-latex 16 Fr. Placed Placement date: If unknown, DO NOT use \"Add Comment\" note/Placement time: If unknown, DO NOT use \"Add Comment\" note: 11/11/24 0204   Indications: Selected surgeries ( tract, abdomen)  Inserted by: DONAL Nicholas RN  Number Of Insertion Attempts: 1  Hand Hygiene Completed: Yes  Catheter Type: Non-latex  Tube Size (Fr.): 16 Fr.  Catheter Balloon Size: 10 mL  Cleansed with: (c) Perineal hygiene wipe  Urine Returned: Yes Ania Tamez RN   02:01:42 Orders Placed Medications  - magnesium sulfate 20 GM/500ML infusion 4 g; magnesium sulfate 20 GM/500ML infusion Hamida Rouse MD   02:01:09 Orders Discontinued magnesium sulfate 4g/100mL (PREMIX) infusion ; magnesium sulfate 2g/50 mL (PREMIX) infusion Adry Suresh, PharmD   02:01 Vital Signs Other flowsheet entries  Heart Rate: 82 (Device Time: 02:00:35)  SpO2: 100 % (Device Time: 02:00:35) Ania Tamez RN   02:00 Other Flowsheet Documentation Other flowsheet entries  (0-10) Pain Rating: Activity: 0  (0-10) Pain Rating: Rest: 0  Contraction Frequency (Minutes): occasional Yu, Laquita, RN   02:00 [REMOVED] Urethral Catheter Non-latex 16 Fr. Placed Removal date: If not present on admission, USE TODAY'S DATE/Removal time: If not present on admission, USE TODAY'S TIME: 11/11/24 0240  Placement date: If unknown, DO NOT use \"Add Comment\" note/Placement time: If unknown, DO NOT use \"Add Comment\" note: 11/11/24 0200   Indications: Selected surgeries ( tract, abdomen)  Inserted by: MICAH Nicholas RN  Number Of Insertion Attempts: 1  Hand Hygiene Completed: Yes  Catheter Type: Non-latex  Tube Size (Fr.): 16 Fr.  Catheter Balloon Size: 10 mL  Cleansed with: Perineal hygiene wipe  Urine Returned: Yes  Removal Reason : (c) Laquita Nicholas RN   01:59:53 Orders Discontinued magnesium sulfate 20 GM/500ML infusion 4 g ; magnesium sulfate 20 GM/500ML infusion Kerwin, Yajairathan, PharmD   01:56 Vital Signs Other " flowsheet entries  Heart Rate: 84 (Device Time: 01:55:35)  SpO2: 100 % (Device Time: 01:55:35) Ania Tamez RN   01:55:49 Orders Placed Medications  - magnesium sulfate 20 GM/500ML infusion 4 g; magnesium sulfate 20 GM/500ML infusion Hamida Rouse MD   01:55:33 Orders Placed Medications  - magnesium sulfate 4g/100mL (PREMIX) infusion; magnesium sulfate 2g/50 mL (PREMIX) infusion Hamida Rouse MD   01:51 Vital Signs Other flowsheet entries  Heart Rate: 91 (Device Time: 01:50:36)  SpO2: 100 % (Device Time: 01:50:36) Ania Tamez RN   01:50 Medication Given Betamethasone Sodium Phosphate injection 12 mg - Dose: 12 mg ; Route: Intramuscular ; Site: Right Ventrogluteal ; Scheduled Time: 0230 ; Linked override order: Betamethasone Sodium Phosphate 12 MG/2ML injection  - ADS Override Pull Ania Tamez RN   01:46 Vital Signs Other flowsheet entries  Heart Rate: 86 (Device Time: 01:45:36)  SpO2: 100 % (Device Time: 01:45:36) Ania Tamez RN   01:45:23 Orders Placed Lab Panel  - CBC & Differential  Lab  - Treponema pallidum AB w/Reflex RPR; aPTT; Protime-INR; Fibrinogen; Fibrin Split Products; D-dimer, Quantitative  Blood Bank  - Type & Screen; Kleihauer-Betke Stain Hamida Rouse MD   01:45 Specimens Collected CBC & Differential  - ID: [id number] Type: Blood    01:45 Specimens Collected Type & Screen  - ID: [id number] Type: Blood  Treponema pallidum AB w/Reflex RPR  - ID: [id number] Type: Blood  Kleihauer-Betke Stain  - ID: [id number] Type: Blood  aPTT  - ID: [id number] Type: Blood  Protime-INR  - ID: [id number] Type: Blood  Fibrinogen  - ID: [id number] Type: Blood  Fibrin Split Products  - ID: [id number] Type: Blood  D-dimer, Quantitative  - ID: [id number] Type: Blood  CBC Auto Differential  - ID: [id number] Type: Blood  Antibody Identification  - ID: [id number] Type: Blood Laquita Nicholas RN   01:41:14 Orders Discontinued Betamethasone  "Sodium Phosphate injection 12 mg Ania Tamez RN   01:41:14 Orders Placed Medications  - Betamethasone Sodium Phosphate injection 12 mg Hamida Rouse MD   01:41 Vital Signs Other flowsheet entries  Heart Rate: 91 (Device Time: 01:40:36)  SpO2: 100 % (Device Time: 01:40:36) Ania Tamez RN   01:40:58 Orders Placed Medications  - Betamethasone Sodium Phosphate injection 12 mg Hamida Roues MD   01:37 OB PCS Body System Other flowsheet entries  Response: At bedside  Provider Name: Dr. Rouse  Notification Route: At bedside Ania Tamez RN   01:36 Vital Signs Other flowsheet entries  Heart Rate: 85 (Device Time: 01:35:36)  SpO2: 100 % (Device Time: 01:35:36) Ania Tamez RN   01:36 OB PCS Body System Other flowsheet entries  Provider Name: Dr Dacosta  Notification Route: Phone call Odalis Phelan RN   01:35 Vital Signs Other flowsheet entries  BP: 137/74 (Device Time: 01:34:53)  Temp: 98.2 °F (36.8 °C)  Temp src: Oral  Heart Rate: 86 (Device Time: 01:34:53)  Resp: 18 Ania Tamez RN   01:35 OB PCS Body System Other flowsheet entries  Response: En route  Provider Name: Dr Rouse  Notification Route: Phone call Odalis Phelan RN   01:35 Medication New Bag lactated ringers infusion - Dose: 75 mL/hr ; Rate: 75 mL/hr ; Route: Intravenous ; Line: Peripheral IV 11/11/24 0135 Distal;Left;Posterior Forearm ; Scheduled Time: 0515 Laquita Nicholas RN   01:35 [REMOVED] Peripheral IV 11/11/24 0135 Distal;Left;Posterior Forearm Placed Removal date: If not present on admission, USE TODAY'S DATE/Removal time: If not present on admission, USE TODAY'S TIME: 11/11/24 0748  Placement date: If unknown, DO NOT use \"Add Comment\" note/Placement time: If unknown, DO NOT use \"Add Comment\" note: 11/11/24 0135   Size (Gauge): 18 G  Orientation: Distal;Left;Posterior  Location: Forearm  Site Prep: Alcohol  Local Anesthetic: None  Technique: Anatomical landmarks  Inserted by: YFN GABRIEL  " Total insertion attempts: 1  Patient Tolerance: Tolerated well  Removed : Per patient/family request  Is this a home health patient?: No Ania Tamez RN   01:32 Patient Arrived in L&D

## 2024-11-11 NOTE — INTERVAL H&P NOTE
H&P updated. The patient was examined and bleeding has stopped. She is aware of non-painful cramping. Good fetal movement. Will continue Magnesium sulfate for now, BMZ #2 tonight, and MFM consult later today. Discussed observation until no bleeding for at least 24 hours.

## 2024-11-11 NOTE — H&P (VIEW-ONLY)
" Kwadwo Armstrong  : 1985  MRN: [id number]  CSN: [id number]    Labor & Delivery BRITTNEY progress note    Subjective   Chief Complaint: She reports bleeding at home, \"filling 3 pads at home\". Perineum with significant blood staining, and small clots noted. Gentle exterior cervical exam with probable 1-2cm dilation and 50% effacement. Known complete previa    HPI: AMA, Gestational diabetes, Polyhydramnios    History:    Prenatal Information:  Prenatal Results       Initial Prenatal Labs       Test Value Reference Range Date Time    Hemoglobin  11.6 g/dL 11.1 - 15.9 05/10/24 0947    Hematocrit  35.9 % 34.0 - 46.6 05/10/24 0947    Platelets  315 x10E3/uL 150 - 450 05/10/24 0947    Rubella IgG  6.02 index Immune >0.99 05/10/24 0947    Hepatitis B SAg  Negative  Negative 05/10/24 0947    Hepatitis C Ab  Non Reactive  Non Reactive 05/10/24 0947    RPR  Non Reactive  Non Reactive 10/15/24 0941       Non Reactive  Non Reactive 05/10/24 0947    T. Pallidum Ab         ABO  A   24 0958    Rh  Negative   24 0958    Antibody Screen  Negative  Negative 24 0958    HIV  Non Reactive  Non Reactive 05/10/24 0947    Urine Culture  Final report   05/10/24 0947    Gonorrhea  Negative  Negative 05/10/24 0947    Chlamydia  Negative  Negative 05/10/24 0947    TSH        HgB A1c         Varicella IgG        Hemoglobinopathy Fractionation        Hemoglobinopathy (genetic testing)        Cystic fibrosis                   Fetal testing        Test Value Reference Range Date Time    NIPT        MSAFP        AFP-4                  2nd and 3rd Trimester       Test Value Reference Range Date Time    Hemoglobin (repeated)  9.3 g/dL 11.1 - 15.9 10/15/24 0941    Hematocrit (repeated)        Platelets   315 x10E3/uL 150 - 450 05/10/24 0947    1 hour GTT   157 mg/dL 70 - 139 10/15/24 0941    Antibody Screen (repeated)  Positive  Negative 10/15/24 0941       See Final Results  Negative 10/15/24 0941    3rd TM syphilis " scrn (repeated)  RPR   Non Reactive  Non Reactive 10/15/24 0941    3rd TM syphilis scrn (repeated) TP-Ab        3rd TM syphilis screen TB-Ab (FTA)        Syphilis cascade test TP-Ab (EIA)        Syphilis cascade TPPA        GTT Fasting  82 mg/dL 70 - 94 10/29/24 0730    GTT 1 Hr  186 mg/dL 70 - 179 10/29/24 0730    GTT 2 Hr  178 mg/dL 70 - 154 10/29/24 0730    GTT 3 Hr  97 mg/dL 70 - 139 10/29/24 0730    Group B Strep                  Other testing        Test Value Reference Range Date Time    Parvo IgG         CMV IgG                   Drug Screening       Test Value Reference Range Date Time    Amphetamine Screen        Barbiturate Screen        Benzodiazepine Screen        Methadone Screen        Phencyclidine Screen        Opiates Screen        THC Screen        Cocaine Screen        Propoxyphene Screen        Buprenorphine Screen        Methamphetamine Screen        Oxycodone Screen        Tricyclic Antidepressants Screen                  Legend    ^: Historical                          External Prenatal Results       Pregnancy Outside Results - Transcribed From Office Records - See Scanned Records For Details       Test Value Date Time    ABO  A  04/30/24 0958    Rh  Negative  04/30/24 0958    Antibody Screen  Positive  10/15/24 0941       See Final Results  10/15/24 0941       Negative  04/30/24 0958    Varicella IgG       Rubella  6.02 index 05/10/24 0947    Hgb  9.3 g/dL 10/15/24 0941       11.6 g/dL 05/10/24 0947    Hct  35.9 % 05/10/24 0947    HgB A1c        1h GTT  157 mg/dL 10/15/24 0941    3h GTT Fasting  82 mg/dL 10/29/24 0730    3h GTT 1 hour  186 mg/dL 10/29/24 0730    3h GTT 2 hour  178 mg/dL 10/29/24 0730    3h GTT 3 hour   97 mg/dL 10/29/24 0730    Gonorrhea (discrete)  Negative  05/10/24 0947    Chlamydia (discrete)  Negative  05/10/24 0947    RPR  Non Reactive  10/15/24 0941       Non Reactive  05/10/24 0947    Syphils cascade: TP-Ab (FTA)       TP-Ab       TP-Ab (EIA)       TPPA       HBsAg   Negative  05/10/24 0947    Herpes Simplex Virus PCR       Herpes Simplex VIrus Culture       HIV  Non Reactive  05/10/24 0947    Hep C RNA Quant PCR       Hep C Antibody  Non Reactive  05/10/24 0947    AFP       NIPT       Cystic Fibroisis        Group B Strep       GBS Susceptibility to Clindamycin       GBS Susceptibility to Erythromycin       Fetal Fibronectin       Genetic Testing, Maternal Blood                 Drug Screening       Test Value Date Time    Urine Drug Screen       Amphetamine Screen       Barbiturate Screen       Benzodiazepine Screen       Methadone Screen       Phencyclidine Screen       Opiates Screen       THC Screen       Cocaine Screen       Propoxyphene Screen       Buprenorphine Screen       Methamphetamine Screen       Oxycodone Screen       Tricyclic Antidepressants Screen                 Legend    ^: Historical                             Past OB History:     OB History    Para Term  AB Living   3 2 2 0 0 2   SAB IAB Ectopic Molar Multiple Live Births   0 0 0 0 0 2      # Outcome Date GA Lbr Tito/2nd Weight Sex Type Anes PTL Lv   3 Current            2 Term      Vag-Spont   NIGEL   1 Term      Vag-Spont   NIGEL       Past Medical History: Past Medical History:   Diagnosis Date    Basal cell carcinoma of lower eyelid, right     Gestational diabetes       Past Surgical History Past Surgical History:   Procedure Laterality Date    AUGMENTATION MAMMAPLASTY Bilateral 2004    BREAST BIOPSY        Family History: Family History   Problem Relation Age of Onset    Hypertension Father     Breast cancer Paternal Grandmother       Social History:  reports that she has never smoked. She has never used smokeless tobacco.   reports that she does not currently use alcohol.   reports no history of drug use.           High Risk Medical Conditions/Complaints this visit: GDM, AMA, complete previa, polyhydramnios    Discussion of Management or Test Interpretation with physician/provider/healthcare  provider: yes    External Records Reviewed: Prenatal history in [facility] from [facility] provider - PEDRO Dacosta reviewed, pregnancy complicated by: see above    Review Previous Test Results: Prenatal labs in [facility] reviewed, history of: diabetes, anemia    Independent Historian: None    Social Determinants to Health: No drug, transportation or housing or other issues noted       Objective   Medical Decision Making:  Amount/Complexity of Data Reviewed  -Labs ordered - full admission panel  -Imaging ordered - n/a  -IV fluids given - yes  Risk:  Prescription drug management  Drug therapy requiring intensive monitoring for toxicity - magnesium, betamethasone  Decision to admit patient - yes  Diagnosis/Treatment limited by social determinants    Min/max vitals past 24 hours:  Temp  Min: 98.2 °F (36.8 °C)  Max: 98.2 °F (36.8 °C)   BP  Min: 137/74  Max: 137/74   Pulse  Min: 82  Max: 91   Resp  Min: 18  Max: 18        Independent Interpretation NST/FHT's: reassuring and category 1.  external monitors used   Cervix: was checked (by me): 1.5 cm / 50 % / -3   Contractions:    Review of current test: results regular    pending       Final Diagnoses: third trimester hemorrhage with known complete previa       Assessment   IUP at 32w4d  Third trimester hemorrhage  Complete previa  Contractions  GDM  Polyhydramnios  AMA     Plan   Admit  Notified private MD  Magnesium/Steroids      Hamida Rouse MD  11/11/2024  02:13 CST

## 2024-11-12 LAB
ABO GROUP BLD: NORMAL
BLD GP AB SCN SERPL QL: POSITIVE
GLUCOSE BLDC GLUCOMTR-MCNC: 103 MG/DL (ref 70–130)
GLUCOSE BLDC GLUCOMTR-MCNC: 131 MG/DL (ref 70–130)
GLUCOSE BLDC GLUCOMTR-MCNC: 174 MG/DL (ref 70–130)
NUMBER OF DOSES: NORMAL
RESIDUAL RHIG DETECTED: NORMAL
RH BLD: NEGATIVE

## 2024-11-12 PROCEDURE — 86850 RBC ANTIBODY SCREEN: CPT | Performed by: OBSTETRICS & GYNECOLOGY

## 2024-11-12 PROCEDURE — 82948 REAGENT STRIP/BLOOD GLUCOSE: CPT

## 2024-11-12 PROCEDURE — 86870 RBC ANTIBODY IDENTIFICATION: CPT | Performed by: OBSTETRICS & GYNECOLOGY

## 2024-11-12 PROCEDURE — 63710000001 DIPHENHYDRAMINE PER 50 MG: Performed by: OBSTETRICS & GYNECOLOGY

## 2024-11-12 PROCEDURE — 25010000002 RHO D IMMUNE GLOBULIN 1500 UNIT/2ML SOLUTION PREFILLED SYRINGE: Performed by: OBSTETRICS & GYNECOLOGY

## 2024-11-12 PROCEDURE — 25010000002 BETAMETHASONE SODIUM PHOSPHATE 12 MG/2ML SOLUTION: Performed by: OBSTETRICS & GYNECOLOGY

## 2024-11-12 PROCEDURE — 86901 BLOOD TYPING SEROLOGIC RH(D): CPT | Performed by: OBSTETRICS & GYNECOLOGY

## 2024-11-12 PROCEDURE — 86900 BLOOD TYPING SEROLOGIC ABO: CPT | Performed by: OBSTETRICS & GYNECOLOGY

## 2024-11-12 PROCEDURE — 59025 FETAL NON-STRESS TEST: CPT

## 2024-11-12 RX ADMIN — PANTOPRAZOLE SODIUM 20 MG: 20 TABLET, DELAYED RELEASE ORAL at 06:04

## 2024-11-12 RX ADMIN — CETIRIZINE HYDROCHLORIDE 10 MG: 10 TABLET, FILM COATED ORAL at 20:29

## 2024-11-12 RX ADMIN — SERTRALINE HYDROCHLORIDE 50 MG: 50 TABLET ORAL at 21:10

## 2024-11-12 RX ADMIN — Medication 12 MG: at 02:07

## 2024-11-12 RX ADMIN — HUMAN RHO(D) IMMUNE GLOBULIN 1500 UNITS: 1500 SOLUTION INTRAMUSCULAR; INTRAVENOUS at 21:10

## 2024-11-12 RX ADMIN — DIPHENHYDRAMINE HYDROCHLORIDE 50 MG: 50 CAPSULE ORAL at 21:30

## 2024-11-12 NOTE — PLAN OF CARE
Goal Outcome Evaluation:           Progress: improving  Outcome Evaluation: No s/s of labor. No vaginal bleeding at this tme. Pt to recieve Rhogam today. VSS. Pt has not c/o pain this shift.

## 2024-11-12 NOTE — PLAN OF CARE
Problem: Adult Inpatient Plan of Care  Goal: Patient-Specific Goal (Individualized)  Outcome: Progressing  Flowsheets (Taken 11/12/2024 0408)  Patient/Family-Specific Goals (Include Timeframe): no more vaginal bleeding and go home by Thursday  Individualized Care Needs: give reassurance  Anxieties, Fears or Concerns: concerns about more vaginal bleeding   Goal Outcome Evaluation:  Plan of Care Reviewed With: patient, spouse        Progress: improving  Outcome Evaluation: no vaginal bleeding overnight; irritability, but no contractions; VSS; planning to move to 2A in the morning; 2nd dose of BMZ given at 0207

## 2024-11-12 NOTE — PROGRESS NOTES
Moises Dacosta MD  [facility]  [address]  [place] [address]  Office [phone]  Fax [phone]      [facility]  Sonia Armstrong  : 1985  MRN: [id number]  CSN: [id number]    Antepartum Progress Note    Subjective   Sonia is 32 weeks 5 days gestational age today, admitted with bleeding with placenta previa. Has received BMZ #2 overnight. Reports no contractions and has some dark red staining but no active bleeding.     Objective     Min/max vitals past 24 hours:   Temp  Min: 97.6 °F (36.4 °C)  Max: 98.3 °F (36.8 °C)  BP  Min: 115/65  Max: 139/81  Pulse  Min: 77  Max: 97  Resp  Min: 16  Max: 16         General: well developed; well nourished  no acute distress  mentation appropriate   Heart: regular rate and rhythm   Lungs: breathing is unlabored   Abdomen: soft, non-tender; no masses   FHT's: reactive and category 1.  external monitors used   Cervix: was not checked.   Contractions: none   Ext: No calf tenderness           Assessment   IUP at 32w5d  Placenta previa with hemorrhage  Gestational diabetes A1  Advanced maternal age  Polyhydramnios  Large for gestational age fetus  Rh negative     Plan   Will transfer to floor for inpatient observation of bleeding. Continue to follow FSBS but only mild elevation from steroid effect thus far. Rhogam today. Plan  at 36 weeks unless has another bleeding episode.    Moises Dacosta MD  2024  07:56 CST

## 2024-11-12 NOTE — NON STRESS TEST
SoniaWhit Armstrong, a  at 32w5d with an WANDA of 2025, by Ultrasound, was seen at [facility] LABOR DELIVERY for a nonstress test.    Chief Complaint   Patient presents with    Vaginal Bleeding       Patient Active Problem List   Diagnosis    Antepartum hemorrhage from placenta previa    Potential for premature delivery       Start Time: 712  Stop Time: 732    Interpretation A  Nonstress Test Interpretation A: Reactive (24 : Pamela Umaña RN)  Comments A: Reactive NST verified with BISHOP Beasley RN (24 : Pamela Umaña, YFN)

## 2024-11-13 ENCOUNTER — TELEPHONE (OUTPATIENT)
Dept: OBSTETRICS AND GYNECOLOGY | Age: 39
End: 2024-11-13
Payer: COMMERCIAL

## 2024-11-13 LAB
GLUCOSE BLDC GLUCOMTR-MCNC: 102 MG/DL (ref 70–130)
GLUCOSE BLDC GLUCOMTR-MCNC: 108 MG/DL (ref 70–130)
GLUCOSE BLDC GLUCOMTR-MCNC: 83 MG/DL (ref 70–130)
GLUCOSE BLDC GLUCOMTR-MCNC: 86 MG/DL (ref 70–130)
GLUCOSE BLDC GLUCOMTR-MCNC: 94 MG/DL (ref 70–130)

## 2024-11-13 PROCEDURE — 59025 FETAL NON-STRESS TEST: CPT

## 2024-11-13 PROCEDURE — 82948 REAGENT STRIP/BLOOD GLUCOSE: CPT

## 2024-11-13 RX ADMIN — PANTOPRAZOLE SODIUM 20 MG: 20 TABLET, DELAYED RELEASE ORAL at 06:36

## 2024-11-13 RX ADMIN — SERTRALINE HYDROCHLORIDE 50 MG: 50 TABLET ORAL at 20:47

## 2024-11-13 RX ADMIN — Medication 10 ML: at 20:47

## 2024-11-13 RX ADMIN — CETIRIZINE HYDROCHLORIDE 10 MG: 10 TABLET, FILM COATED ORAL at 20:47

## 2024-11-13 NOTE — NON STRESS TEST
SoniaWhit Armstrong, a  at 32w6d with an WANDA of 2025, by Ultrasound, was seen at [facility]  for a nonstress test.    Chief Complaint   Patient presents with    Vaginal Bleeding       Patient Active Problem List   Diagnosis    Antepartum hemorrhage from placenta previa    Potential for premature delivery       Start Time: 904  Stop Time: 924    Interpretation A  Nonstress Test Interpretation A: Reactive  Comments A: Verified with JULEE Barrera RN

## 2024-11-13 NOTE — PLAN OF CARE
Goal Outcome Evaluation:  Plan of Care Reviewed With: patient, grandchild(bhavin)        Progress: improving  Outcome Evaluation: vss, ambulating and voiding, denies any bleeding this shift, denies old blood when wiping, NST completed and reactive, no c/o of pain, IV IID-flushes well, family at bedside,. looking to possible be d/c tomorrow.

## 2024-11-13 NOTE — PLAN OF CARE
Goal Outcome Evaluation:           Progress: improving  Outcome Evaluation: VSS, ambulating, voiding, no bleeding this shift, pt reports less old blood when wiping than previous day, NST  completed, Rhogam given, complains of no pain, SO at bedside, showered this shift, IV IID

## 2024-11-13 NOTE — TELEPHONE ENCOUNTER
Hub staff attempted to follow warm transfer process and was unsuccessful     Caller: Sonia Armstrong    Relationship to patient: Self    Best call back number: [phone]      Patient is needing: PT IS CURRENTLY IN THE HOSPITAL AND SHOULD BE DISCHARGED TOMORROW.   PT WOULD LIKE TO KNOW IF SHE NEEDS TO KEEP HER APPTS TOMORROW OR RESCHEDULE

## 2024-11-13 NOTE — PROGRESS NOTES
[facility]  Sonia Armstrong  : 1985  MRN: [id number]  CSN: [id number]    Antepartum Progress Note    Subjective   Sonia Armstrong is a 39 y.o. year old  with an Estimated Date of Delivery: 25 currently at 32w6d who initially presented with vaginal bleeding.  The patient currently reports no complaints.She relates she stopped having even the brown, old bleeding yesterday. Denies contractions, vaginal bleeding, or other concerns. Endorses good fetal movement.        Objective     Min/max vitals past 24 hours:   Temp  Min: 98 °F (36.7 °C)  Max: 98.7 °F (37.1 °C)  BP  Min: 115/76  Max: 142/73  Pulse  Min: 80  Max: 88  Resp  Min: 15  Max: 16         General: well developed; well nourished  no acute distress  mentation appropriate   Heart: Not performed.   Lungs: breathing is unlabored   Abdomen: soft, non-tender; no masses  no hepato-splenomegaly  Gravid uterus   FHT's: reactive NSTs   Cervix: was not checked.   Contractions: none   Back: bilateral CVA tenderness is absent           Assessment   IUP at 32w6d - received betamethasone x 2  Placenta previa with hemorrhage - no active bleeding at this time  Gestational diabetes A1  Polyhydramnios  Large for gestational age fetus  Rh negative - Received Rhogam 2024  GBS unknown     Plan   Continue to monitor with fasting and postprandial blood sugar checks. Continue to monitor maternal well-being and signs of labor or vaginal bleeding. Monitor fetal well being with at least NST twice daily. Anticipate  delivery at 36 weeks gestation or sooner if indicated.     Lashonad Yuan, APRN  2024  07:17 CST

## 2024-11-13 NOTE — NON STRESS TEST
SoniaWhit Armstrong, a  at 32w5d with an WANDA of 2025, by Ultrasound, was seen at [facility]  for a nonstress test.    Chief Complaint   Patient presents with    Vaginal Bleeding       Patient Active Problem List   Diagnosis    Antepartum hemorrhage from placenta previa    Potential for premature delivery       Start Time:   Stop Time:     Interpretation A  Nonstress Test Interpretation A: Reactive (24 : Ania Tamez, RN)  Comments A: verified with BISHOP Tamez RNC and LIGIA Sarabia RN (24 : Ania Tamez, RN)

## 2024-11-13 NOTE — PAYOR COMM NOTE
"REF:    [id number]     [facility]  [place],  [phone]  OR   FAX  [phone]    Armstrong Sonia (39 y.o. Female)       Date of Birth   1985    Social Security Number       Address   [address] [place], [place] [address]    Home Phone   [phone]    MRN   [id number]       Congregational   Roman [employer]    Marital Status                               Admission Date   11/11/24    Admission Type   Elective    Admitting Provider   Moises Dacosta MD    Attending Provider   Moises Dacosta MD    Department, Room/Bed   [facility] 2A, M243/1       Discharge Date       Discharge Disposition       Discharge Destination                                 Attending Provider: Moises Dacosta MD    Allergies: No Known Allergies    Isolation: None   Infection: None   Code Status: CPR    Ht: 170.2 cm (67\")   Wt: 93.8 kg (206 lb 12.8 oz)    Admission Cmt: None   Principal Problem: Antepartum hemorrhage from placenta previa [O44.10]                   Active Insurance as of 11/11/2024       Primary Coverage       Payor Plan Insurance Group Employer/Plan Group    ANTHEM BLUE CROSS ANTHEM BLUE CROSS BLUE SHIELD PPO [id number]       Payor Plan Address Payor Plan Phone Number Payor Plan Fax Number Effective Dates    [address] [phone]  4/14/2023 - None Entered    [place] [address]         Subscriber Name Subscriber Birth Date Member ID       HOLLIE ARMSTRONG 1985 [id number]                     Emergency Contacts        (Rel.) Home Phone Work Phone Mobile Phone    Hollie Armstrong (Spouse) [phone] -- --        Maisha Osorio, RN   Registered Nurse  Nursing     Plan of Care     Signed     Date of Service: 11/13/24 0458  Creation Time: 11/13/24 0458     Signed         Goal Outcome Evaluation:  Progress: improving  Outcome Evaluation: VSS, ambulating, voiding, no bleeding this shift, pt reports less old blood when wiping than previous day, " NST  completed, Rhogam given, complains of no pain, SO at bedside, showered this shift, IV IID                     Vital Signs (last day)       Date/Time Temp Temp src Pulse Resp BP Patient Position SpO2    11/12/24 2025 98.7 (37.1) Oral 80 16 120/70 Sitting 99    11/12/24 1703 98 (36.7) Temporal 83 15 115/76 Sitting 100    11/12/24 1100 98.1 (36.7) Temporal 88 16 142/73 Sitting 100    11/12/24 0955 98.4 (36.9) Oral 81 16 130/66 Sitting 100    11/12/24 0708 98.3 (36.8) Oral 77 16 118/76 Lying --          Oxygen Therapy (last day)       Date/Time SpO2 Device (Oxygen Therapy) Flow (L/min) (Oxygen Therapy) Oxygen Concentration (%) ETCO2 (mmHg)    11/12/24 2025 99 room air -- -- --    11/12/24 1703 100 room air -- -- --    11/12/24 1100 100 room air -- -- --    11/12/24 0955 100 room air -- -- --    11/12/24 0710 -- room air -- -- --          Intake & Output (last day)         11/12 0701 11/13 0700 11/13 0701 11/14 0700    Urine (mL/kg/hr) 1000 (0.4)     Total Output 1000     Net -1000                 Current Facility-Administered Medications   Medication Dose Route Frequency Provider Last Rate Last Admin    acetaminophen (TYLENOL) tablet 1,000 mg  1,000 mg Oral Q6H PRN Moises Dacosta MD   1,000 mg at 11/11/24 0943    bisacodyl (DULCOLAX) suppository 10 mg  10 mg Rectal Daily PRN Moises Dacosta MD        calcium carbonate (TUMS) chewable tablet 500 mg (200 mg elemental)  2 tablet Oral Daily PRN Moises Dacosta MD        cetirizine (zyrTEC) tablet 10 mg  10 mg Oral Nightly Mj William MD   10 mg at 11/12/24 2029    diphenhydrAMINE (BENADRYL) capsule 50 mg  50 mg Oral Nightly PRN Mj William MD   50 mg at 11/12/24 2130    docusate sodium (COLACE) capsule 100 mg  100 mg Oral BID PRN [place], Moises NEVAREZ MD        ondansetron ODT (ZOFRAN-ODT) disintegrating tablet 8 mg  8 mg Oral Q8H PRN [place] MD        Or    ondansetron (ZOFRAN) injection 4 mg  4 mg Intravenous Q8H PRN [place],  Moises NEVAREZ MD        pantoprazole (PROTONIX) EC tablet 20 mg  20 mg Oral Q AM Mj William MD   20 mg at 11/13/24 0636    sertraline (ZOLOFT) tablet 50 mg  50 mg Oral Daily Mj William MD   50 mg at 11/12/24 2110    sodium chloride 0.9 % flush 10 mL  10 mL Intravenous Q12H [place]        sodium chloride 0.9 % flush 10 mL  10 mL Intravenous PRN [place]   10 mL at 11/11/24 1926    sodium chloride 0.9 % infusion 40 mL  40 mL Intravenous PRN Moises Dacosta MD         Orders (last 24 hrs)        Start     Ordered    11/15/24 0600  Type & Screen  Every 72 Hours       11/13/24 0653    11/15/24 0600  CBC & Differential  Every Third Day       11/13/24 0653    11/13/24 0649  POC Glucose Once  PROCEDURE ONCE        Comments: Complete no more than 45 minutes prior to patient eating      11/13/24 0636    11/12/24 1918  POC Glucose Once  PROCEDURE ONCE        Comments: Complete no more than 45 minutes prior to patient eating      11/12/24 1902    11/12/24 1833  Rho D Immune Globulin (RHOPHYLAC) injection 1,500 Units  During Hospitalization         11/12/24 1833    11/12/24 1741  Rho D immune globulin (RHOPHYLAC) IM syringe 1,500 Units  During Hospitalization,   Status:  Discontinued         11/12/24 1741    11/12/24 1034  Antibody Identification  Once         11/12/24 1033    11/12/24 0933  POC Glucose Once  PROCEDURE ONCE        Comments: Complete no more than 45 minutes prior to patient eating      11/12/24 0921    11/12/24 0924  Diet: Regular/House, Diabetic; Gestational Consistent Carbohydrate; Fluid Consistency: Thin (IDDSI 0)  Diet Effective Now         11/12/24 0924    11/12/24 0848  Transfer Patient  Once         11/12/24 0847    11/12/24 0800  Fetal Nonstress Test  4 Times Daily,   Status:  Canceled      Comments: Patient presents with:  Vaginal Bleeding  NST BID q Shift    11/12/24 0746    11/12/24 0800  Fetal Nonstress Test  2 Times Daily      Comments: Patient presents  "with:  Vaginal Bleeding  NST BID q Shift    11/12/24 0755 11/11/24 2045  cetirizine (zyrTEC) tablet 10 mg  Nightly         11/11/24 1946 11/11/24 2045  sertraline (ZOLOFT) tablet 50 mg  Daily         11/11/24 1946 11/11/24 2000  pantoprazole (PROTONIX) EC tablet 20 mg  Every Early Morning         11/11/24 1946 11/11/24 1946  diphenhydrAMINE (BENADRYL) capsule 50 mg  Nightly PRN         11/11/24 1946 11/11/24 1200  Vital Signs q 4 while awake  Every 4 Hours      Comments: While the patient is awake.    11/11/24 0809    11/11/24 0900  sodium chloride 0.9 % flush 10 mL  Every 12 Hours Scheduled         11/11/24 0809    11/11/24 0900  POC Glucose 4x Daily Fasting & PC  4 Times Daily Fasting & After Meals      Comments: Complete no more than 45 minutes prior to patient eating      11/11/24 0809    11/11/24 0810  Weigh Patient Daily  Daily       11/11/24 0809    11/11/24 0805  sodium chloride 0.9 % flush 10 mL  As Needed         11/11/24 0809    11/11/24 0805  sodium chloride 0.9 % infusion 40 mL  As Needed         11/11/24 0809    11/11/24 0805  calcium carbonate (TUMS) chewable tablet 500 mg (200 mg elemental)  Daily PRN         11/11/24 0809    11/11/24 0805  ondansetron ODT (ZOFRAN-ODT) disintegrating tablet 8 mg  Every 8 Hours PRN        Placed in \"Or\" Linked Group    11/11/24 0809    11/11/24 0805  ondansetron (ZOFRAN) injection 4 mg  Every 8 Hours PRN        Placed in \"Or\" Linked Group    11/11/24 0809    11/11/24 0805  docusate sodium (COLACE) capsule 100 mg  2 Times Daily PRN         11/11/24 0809    11/11/24 0805  bisacodyl (DULCOLAX) suppository 10 mg  Daily PRN         11/11/24 0809    11/11/24 0731  acetaminophen (TYLENOL) tablet 1,000 mg  Every 6 Hours PRN         11/11/24 0731    11/11/24 0230  Betamethasone Sodium Phosphate injection 12 mg  Every 24 Hours,   Status:  Discontinued         11/11/24 0141    Unscheduled  Position Change - For Intra-Uterine Resusitation for Hypertonus, " HyperStimulation or Non-Reassuring Fetal Status  As Needed       24 0809                     Physician Progress Notes (last 24 hours)        Lashonda Yuan APRN at 24 0705       Attestation signed by Jocy Prieto MD at 24 0803    I have reviewed this documentation and agree.                  [facility]  Sonia Armstrong  : 1985  MRN: [id number]  CSN: [id number]    Antepartum Progress Note    Subjective   Sonia Armstrong is a 39 y.o. year old  with an Estimated Date of Delivery: 25 currently at 32w6d who initially presented with vaginal bleeding.  The patient currently reports no complaints.She relates she stopped having even the brown, old bleeding yesterday. Denies contractions, vaginal bleeding, or other concerns. Endorses good fetal movement.       Objective     Min/max vitals past 24 hours:   Temp  Min: 98 °F (36.7 °C)  Max: 98.7 °F (37.1 °C)  BP  Min: 115/76  Max: 142/73  Pulse  Min: 80  Max: 88  Resp  Min: 15  Max: 16         General: well developed; well nourished  no acute distress  mentation appropriate   Heart: Not performed.   Lungs: breathing is unlabored   Abdomen: soft, non-tender; no masses  no hepato-splenomegaly  Gravid uterus   FHT's: reactive NSTs   Cervix: was not checked.   Contractions: none   Back: bilateral CVA tenderness is absent          Assessment   IUP at 32w6d - received betamethasone x 2  Placenta previa with hemorrhage - no active bleeding at this time  Gestational diabetes A1  Polyhydramnios  Large for gestational age fetus  Rh negative - Received Rhogam 2024  GBS unknown    Plan   Continue to monitor with fasting and postprandial blood sugar checks. Continue to monitor maternal well-being and signs of labor or vaginal bleeding. Monitor fetal well being with at least NST twice daily. Anticipate  delivery at 36 weeks gestation or sooner if indicated.     JOE Shelton  2024  07:17 CST                Electronically signed by Jocy Prieto MD at 11/13/24 0860

## 2024-11-14 ENCOUNTER — CLINICAL DOCUMENTATION (OUTPATIENT)
Dept: OTHER | Facility: CLINIC | Age: 39
End: 2024-11-14

## 2024-11-14 VITALS
WEIGHT: 207.5 LBS | HEART RATE: 73 BPM | BODY MASS INDEX: 32.5 KG/M2 | RESPIRATION RATE: 18 BRPM | OXYGEN SATURATION: 97 % | SYSTOLIC BLOOD PRESSURE: 120 MMHG | TEMPERATURE: 97.6 F | DIASTOLIC BLOOD PRESSURE: 75 MMHG

## 2024-11-14 DIAGNOSIS — O44.03 PLACENTA PREVIA, THIRD TRIMESTER: Primary | ICD-10-CM

## 2024-11-14 LAB
GLUCOSE BLDC GLUCOMTR-MCNC: 111 MG/DL (ref 70–130)
GLUCOSE BLDC GLUCOMTR-MCNC: 97 MG/DL (ref 70–130)

## 2024-11-14 PROCEDURE — 82948 REAGENT STRIP/BLOOD GLUCOSE: CPT

## 2024-11-14 PROCEDURE — 59025 FETAL NON-STRESS TEST: CPT

## 2024-11-14 RX ADMIN — PANTOPRAZOLE SODIUM 20 MG: 20 TABLET, DELAYED RELEASE ORAL at 06:09

## 2024-11-14 NOTE — NON STRESS TEST
SoniaWhit Armstrong, a  at 32w6d with an WANDA of 2025, by Ultrasound, was seen at [facility]  for a nonstress test.    Chief Complaint   Patient presents with    Vaginal Bleeding       Patient Active Problem List   Diagnosis    Antepartum hemorrhage from placenta previa    Potential for premature delivery       Start Time:   Stop Time:     Interpretation A  Nonstress Test Interpretation A: Reactive (24 : Nisreen Beltran, RN)  Comments A: verified with MITCHELL Howard RN (24 : Nisreen Beltran, RN)

## 2024-11-14 NOTE — PROGRESS NOTES
MFM FU    Now 33 0/7 weeks  Posterior previa, s/p first bleed    Since admission, she has had no further signs of new bleeding. Small amount of brown spotting.   Consistent fetal movement  Feeling well  VSS  FHT appropriate  S/P betamethsone course.       I think it is reasonable for her to shift to outpatient management based upon her/fetal stability.   We discussed the importance of returning directly to the hospital if she has any signs of new bleeding or signs of PTL.   If any further bleeding, would keep as an inpatient until delivery    Recommend weekly visits in your office.   Recommend FU US at 35 weeks to assess if previa persists  If the previa persists, recommend delivery at 36 weeks. Sooner if labor or significant bleeding episode.     All questions answered. Time on floor/unit for chart review, charting, patient evaluation, discussion and coordination of care was >40 minutes with >50% in face to face counseling and coordination of care on the issues as listed above.     As always, if there are any questions/concerns, please do not hesitate to contact me.   Thanks  Gary  [phone]

## 2024-11-14 NOTE — PLAN OF CARE
Goal Outcome Evaluation:  Plan of Care Reviewed With: patient, spouse        Progress: improving  Outcome Evaluation: VSS. Denies any vaginal bleeding during this shift, does have a smear of brown with she wipes in the bathroom. Denies LOF and UC. Reports fetal movement. NST reactive this shift. Resting between care, and has rested tonight. Showered. Denies pain.

## 2024-11-14 NOTE — DISCHARGE SUMMARY
[place]  Sonia Armstrong  : 1985  MRN: [id number]  CSN: [id number]    Discharge Summary      Date of Admission: 2024   Date of Discharge: 2024   Discharge Diagnosis: Pregnancy at 33 weeks gestational age  Placenta previa with hemorrhage   Procedures Performed: Betamethasone      Consults: Maternal-fetal medicine   Brief History: Patient is a 39 y.o.who presented complaining of vaginal bleeding that woke her up from sleep.  She has a known posterior placenta previa.   Hospital Course: She was admitted to the hospital and given betamethasone for fetal lung maturity.  Vaginal bleeding quickly subsided and she was observed for 3 days to ensure that there was no immediate recurrence.   Pending Studies: None.     Condition at discharge: stable   Discharge Medications:    Your medication list        CONTINUE taking these medications        Instructions Last Dose Given Next Dose Due   aspirin 81 MG EC tablet      Take 1 tablet by mouth Daily.       ferrous sulfate 325 (65 FE) MG tablet      Take 1 tablet by mouth Daily With Breakfast.       glucose blood test strip      Pt to test QID, fasting and then 1h after each meal       glucose monitor monitoring kit      Pt to test QID, fasting and then 1h after each meal       Lancets misc      Pt to test QID, fasting and then 1h after each meal       prenatal (CLASSIC) vitamin 28-0.8 MG tablet tablet  Generic drug: prenatal vitamin      Take  by mouth Daily.       sertraline 50 MG tablet  Commonly known as: ZOLOFT           ZyrTEC Allergy 10 MG tablet  Generic drug: cetirizine      Take 1 tablet by mouth Daily.               Discharge Disposition: home   Follow-up: Future Appointments   Date Time Provider Department Center   2024 10:00 AM US 3 CAROLEE ALVAREZW [facility]   2024 10:30 AM Moises Dacosta MD [facility]  PAD   2024  9:00 AM US 3 CAROLEE ALVAREZW [facility]   2024 10:00 AM Moises Dacosta MD [facility]   PAD            This note has been electronically signed.    Moises Dacosta MD  November 14, 2024  12:45 CST

## 2024-11-14 NOTE — PROGRESS NOTES
Chart review completed. Pt admitted to [facility], she is still admitted currently for COMPLETE PLACENTA PREVIA HEMORRHAGE she is 32w. Will outreach pt once dc from the hospital.

## 2024-11-14 NOTE — PROGRESS NOTES
Moises Dacosta MD  [facility]  [address]  [place] [address]  Office [phone]  Fax [phone]      [facility]  Sonia Armstrong  : 1985  MRN: [id number]  CSN: [id number]    Antepartum Progress Note    Subjective   Sonia is 33 weeks 0 days gestational age today.  She was admitted 3 to 4 days ago due to vaginal bleeding.  She has not had any further active bleeding, but continues to have episodic dark brown spotting.  She denies contractions or leaking fluid.  She reports good fetal movement.     Objective     Min/max vitals past 24 hours:   Temp  Min: 97.8 °F (36.6 °C)  Max: 98.3 °F (36.8 °C)  BP  Min: 110/65  Max: 130/78  Pulse  Min: 71  Max: 88  Resp  Min: 16  Max: 18         General: well developed; well nourished  no acute distress  mentation appropriate   Heart: regular rate and rhythm   Lungs: breathing is unlabored   Abdomen: soft, non-tender; no masses   FHT's: reactive and category 1.  On NST  external monitors used   Cervix: was not checked.   Contractions: none   Extremities: No calf CVA tenderness       Fingerstick blood sugars have been normal for over 24 hours       Assessment   IUP at 33w0d  Placenta previa  Diet-controlled gestational diabetes  Large for gestational age fetus  Polyhydramnios  Rh negative     Plan   She remains stable under inpatient management.  Will discuss with MFM, but further prior recommendation will consider discharge home today for outpatient management.  Plan delivery by 36 weeks unless bleeding recurs.    Moises Dacosta MD  2024  07:42 CST

## 2024-11-14 NOTE — PAYOR COMM NOTE
"REF:   [id number]     [facility]  [place],   [phone]  OR  FAX   [phone]      Bryant ArmstrongMarie (39 y.o. Female)       Date of Birth   1985    Social Security Number       Address   [address] [place], [place] [address]    Home Phone   [phone]    MRN   [id number]       Restoration   Confucianist    Marital Status                               Admission Date   11/11/24    Admission Type   Elective    Admitting Provider   Moises Dacosta MD    Attending Provider   Moises Dacosta MD    Department, Room/Bed   [facility] MOTHER BABY 2A, M243/1       Discharge Date       Discharge Disposition       Discharge Destination                                 Attending Provider: Moises Dacosta MD    Allergies: No Known Allergies    Isolation: None   Infection: None   Code Status: CPR    Ht: 170.2 cm (67\")   Wt: 94.1 kg (207 lb 8 oz)    Admission Cmt: None   Principal Problem: Antepartum hemorrhage from placenta previa [O44.10]                   Active Insurance as of 11/11/2024       Primary Coverage       Payor Plan Insurance Group Employer/Plan Group    ANTHEM BLUE CROSS ANTHEM BLUE CROSS BLUE SHIELD PPO [id number]       Payor Plan Address Payor Plan Phone Number Payor Plan Fax Number Effective Dates    [address] [phone]  4/14/2023 - None Entered    [place] [address]         Subscriber Name Subscriber Birth Date Member ID       HOLLIE ARMSTRONG 1985 [id number]                     Emergency Contacts        (Rel.) Home Phone Work Phone Mobile Phone    NathanielHollie (Spouse) [phone] -- --          Rama Dominguez RN   Registered Nurse  Nursing     Plan of Care     Signed     Date of Service: 11/14/24 0459  Creation Time: 11/14/24 0459     Signed         Goal Outcome Evaluation:  Plan of Care Reviewed With: patient, spouse  Progress: improving  Outcome Evaluation: VSS. Denies any vaginal bleeding during this shift, does have a " smear of brown with she wipes in the bathroom. Denies LOF and UC. Reports fetal movement. NST reactive this shift. Resting between care, and has rested tonight. Showered. Denies pain.                      Vital Signs (last day)       Date/Time Temp Temp src Pulse Resp BP Patient Position SpO2    11/14/24 0744 97.6 (36.4) Oral 73 18 120/75 Sitting 97    11/13/24 2015 97.9 (36.6) Oral 77 18 111/67 Sitting 98    11/13/24 1653 97.8 (36.6) Oral 88 18 126/71 Sitting 97    11/13/24 1100 97.9 (36.6) Oral 82 18 130/78 Sitting 98    11/13/24 0824 98.3 (36.8) Oral 71 16 110/65 Sitting 98          Oxygen Therapy (last day)       Date/Time SpO2 Device (Oxygen Therapy) Flow (L/min) (Oxygen Therapy) Oxygen Concentration (%) ETCO2 (mmHg)    11/14/24 0744 97 room air -- -- --    11/13/24 2015 98 room air -- -- --    11/13/24 1653 97 room air -- -- --    11/13/24 1100 98 room air -- -- --    11/13/24 0824 98 room air -- -- --          Intake & Output (last day)         11/13 0701 11/14 0700 11/14 0701  11/15 0700    Urine (mL/kg/hr)      Total Output      Net                  Current Facility-Administered Medications   Medication Dose Route Frequency Provider Last Rate Last Admin    acetaminophen (TYLENOL) tablet 1,000 mg  1,000 mg Oral Q6H PRN Moises Dacosta MD   1,000 mg at 11/11/24 0943    bisacodyl (DULCOLAX) suppository 10 mg  10 mg Rectal Daily PRN Moises Dacosta MD        calcium carbonate (TUMS) chewable tablet 500 mg (200 mg elemental)  2 tablet Oral Daily PRN Moises Dacosta MD        cetirizine (zyrTEC) tablet 10 mg  10 mg Oral Nightly Mj William MD   10 mg at 11/13/24 2047    diphenhydrAMINE (BENADRYL) capsule 50 mg  50 mg Oral Nightly PRN Mj William MD   50 mg at 11/12/24 2130    docusate sodium (COLACE) capsule 100 mg  100 mg Oral BID PRN Moises Dacosta MD        ondansetron ODT (ZOFRAN-ODT) disintegrating tablet 8 mg  8 mg Oral Q8H PRN Moises Dacosta MD        Or    ondansetron  (ZOFRAN) injection 4 mg  4 mg Intravenous Q8H PRN Moises Dacosta MD        pantoprazole (PROTONIX) EC tablet 20 mg  20 mg Oral Q AM jM William MD   20 mg at 11/14/24 0609    sertraline (ZOLOFT) tablet 50 mg  50 mg Oral Daily Mj William MD   50 mg at 11/13/24 2047    sodium chloride 0.9 % flush 10 mL  10 mL Intravenous Q12H Moises Dacosta MD   10 mL at 11/13/24 2047    sodium chloride 0.9 % flush 10 mL  10 mL Intravenous PRN Moises Dacosta MD   10 mL at 11/11/24 1926    sodium chloride 0.9 % infusion 40 mL  40 mL Intravenous PRN Moises Dacosta MD         Orders (last 24 hrs)        Start     Ordered    11/15/24 0600  Type & Screen  Every 72 Hours       11/13/24 0653    11/15/24 0600  CBC & Differential  Every Third Day       11/13/24 0653    11/14/24 0943  POC Glucose Once  PROCEDURE ONCE        Comments: Complete no more than 45 minutes prior to patient eating      11/14/24 0932    11/14/24 0620  POC Glucose Once  PROCEDURE ONCE        Comments: Complete no more than 45 minutes prior to patient eating      11/14/24 0608    11/13/24 1934  POC Glucose Once  PROCEDURE ONCE        Comments: Complete no more than 45 minutes prior to patient eating      11/13/24 1922    11/13/24 1840  POC Glucose Once  PROCEDURE ONCE        Comments: Complete no more than 45 minutes prior to patient eating      11/13/24 1358    11/13/24 1140  POC Glucose Once  PROCEDURE ONCE        Comments: Complete no more than 45 minutes prior to patient eating      11/13/24 1128    11/13/24 1030  POC Glucose Once  PROCEDURE ONCE        Comments: Complete no more than 45 minutes prior to patient eating      11/13/24 1015    11/12/24 0800  Fetal Nonstress Test  2 Times Daily      Comments: Patient presents with:  Vaginal Bleeding  NST BID q Shift    11/12/24 0755    11/11/24 2045  cetirizine (zyrTEC) tablet 10 mg  Nightly         11/11/24 1946    11/11/24 2045  sertraline (ZOLOFT) tablet 50 mg  Daily         11/11/24  "1946 11/11/24 2000  pantoprazole (PROTONIX) EC tablet 20 mg  Every Early Morning         11/11/24 1946 11/11/24 1946  diphenhydrAMINE (BENADRYL) capsule 50 mg  Nightly PRN         11/11/24 1946 11/11/24 1200  Vital Signs q 4 while awake  Every 4 Hours      Comments: While the patient is awake.    11/11/24 0809    11/11/24 0900  sodium chloride 0.9 % flush 10 mL  Every 12 Hours Scheduled         11/11/24 0809    11/11/24 0900  POC Glucose 4x Daily Fasting & PC  4 Times Daily Fasting & After Meals      Comments: Complete no more than 45 minutes prior to patient eating      11/11/24 0809    11/11/24 0810  Weigh Patient Daily  Daily       11/11/24 0809    11/11/24 0805  sodium chloride 0.9 % flush 10 mL  As Needed         11/11/24 0809    11/11/24 0805  sodium chloride 0.9 % infusion 40 mL  As Needed         11/11/24 0809    11/11/24 0805  calcium carbonate (TUMS) chewable tablet 500 mg (200 mg elemental)  Daily PRN         11/11/24 0809    11/11/24 0805  ondansetron ODT (ZOFRAN-ODT) disintegrating tablet 8 mg  Every 8 Hours PRN        Placed in \"Or\" Linked Group    11/11/24 0809    11/11/24 0805  ondansetron (ZOFRAN) injection 4 mg  Every 8 Hours PRN        Placed in \"Or\" Linked Group    11/11/24 0809    11/11/24 0805  docusate sodium (COLACE) capsule 100 mg  2 Times Daily PRN         11/11/24 0809    11/11/24 0805  bisacodyl (DULCOLAX) suppository 10 mg  Daily PRN         11/11/24 0809    11/11/24 0731  acetaminophen (TYLENOL) tablet 1,000 mg  Every 6 Hours PRN         11/11/24 0731    Unscheduled  Position Change - For Intra-Uterine Resusitation for Hypertonus, HyperStimulation or Non-Reassuring Fetal Status  As Needed       11/11/24 0809                     Physician Progress Notes (last 24 hours)        Moises Dacosta MD at 11/14/24 0742              Moises Dacosta MD  [facility]  [address] [facility] [address] [place] [address]  Office [phone]  Fax [phone]      [facility]  Sonia" Nathaniel  : 1985  MRN: [id number]  CSN: [id number]    Antepartum Progress Note    Subjective   Sonia is 33 weeks 0 days gestational age today.  She was admitted 3 to 4 days ago due to vaginal bleeding.  She has not had any further active bleeding, but continues to have episodic dark brown spotting.  She denies contractions or leaking fluid.  She reports good fetal movement.    Objective     Min/max vitals past 24 hours:   Temp  Min: 97.8 °F (36.6 °C)  Max: 98.3 °F (36.8 °C)  BP  Min: 110/65  Max: 130/78  Pulse  Min: 71  Max: 88  Resp  Min: 16  Max: 18         General: well developed; well nourished  no acute distress  mentation appropriate   Heart: regular rate and rhythm   Lungs: breathing is unlabored   Abdomen: soft, non-tender; no masses   FHT's: reactive and category 1.  On NST  external monitors used   Cervix: was not checked.   Contractions: none   Extremities: No calf CVA tenderness       Fingerstick blood sugars have been normal for over 24 hours      Assessment   IUP at 33w0d  Placenta previa  Diet-controlled gestational diabetes  Large for gestational age fetus  Polyhydramnios  Rh negative    Plan   She remains stable under inpatient management.  Will discuss with MFM, but further prior recommendation will consider discharge home today for outpatient management.  Plan delivery by 36 weeks unless bleeding recurs.    Moises Dacosta MD  2024  07:42 CST               Electronically signed by Moises Dacosta MD at 24 [phone]

## 2024-11-14 NOTE — PAYOR COMM NOTE
"REF:   [id number]     [facility]  FAX  [phone]    Sonia Armstrong (39 y.o. Female)       Date of Birth   1985    Social Security Number       Address   [address] [place], [place] [address]    Home Phone   [phone]    MRN   [id number]       [place]   Protestant    Marital Status                               Admission Date   11/11/24    Admission Type   Elective    Admitting Provider   Flor Dacosta MD    Attending Provider       Department, Room/Bed   [facility] MOTHER BABY 2A, M243/1       Discharge Date   11/14/2024    Discharge Disposition   Home or Self Care    Discharge Destination                                 Attending Provider: (none)   Allergies: No Known Allergies    Isolation: None   Infection: None   Code Status: CPR    Ht: 170.2 cm (67\")   Wt: 94.1 kg (207 lb 8 oz)    Admission Cmt: None   Principal Problem: Antepartum hemorrhage from placenta previa [O44.10]                   Active Insurance as of 11/11/2024       Primary Coverage       Payor Plan Insurance Group Employer/Plan Group    ANTHEM BLUE CROSS ANTHEM BLUE CROSS BLUE SHIELD PPO [id number]       Payor Plan Address Payor Plan Phone Number Payor Plan Fax Number Effective Dates    [address] [phone]  4/14/2023 - None Entered    [place] [address]         Subscriber Name Subscriber Birth Date Member ID       HOLLIE ARMSTRONG 1985 [id number]                     Emergency Contacts        (Rel.) Home Phone Work Phone Mobile Phone    Hollie Armstrong (Spouse) [phone] -- --              Discharge Order (From admission, onward)       Start     Ordered    11/14/24 1048  Discharge patient  Once        Expected Discharge Date: 11/14/24   Discharge Disposition: Home or Self Care   Physician of Record for Attribution - Please select from Treatment Team: FLOR DACOSTA [[id number]]   Review needed by CMO to determine Physician of Record: No      Question Answer " Comment   Physician of Record for Attribution - Please select from Treatment Team FLOR CHAVES    Review needed by CMO to determine Physician of Record No        11/14/24 7155

## 2024-11-14 NOTE — TELEPHONE ENCOUNTER
Pt notified of canceled appt and has been rescheduled. Will work on C/S and give to patient at her next visit.

## 2024-11-14 NOTE — NURSING NOTE
Pt assessed for NST. No new complaints. Pt abd soft and nontender. No c/o of pain. No new vaginal bleeding noted.

## 2024-11-14 NOTE — NON STRESS TEST
SoniaWhit Armstrong, a  at 33w0d with an WANDA of 2025, by Ultrasound, was seen at [facility]ABY  for a nonstress test.    Chief Complaint   Patient presents with    Vaginal Bleeding       Patient Active Problem List   Diagnosis    Antepartum hemorrhage from placenta previa    Potential for premature delivery    Placenta previa, third trimester       Start Time: 1040  Stop Time: 1100    Interpretation A  Nonstress Test Interpretation A: Reactive  Comments A: verified with BERNADETTE Owens RN, MICAH Street RN, and DEANA Solares RN

## 2024-11-21 ENCOUNTER — PREP FOR SURGERY (OUTPATIENT)
Dept: OTHER | Facility: HOSPITAL | Age: 39
End: 2024-11-21
Payer: COMMERCIAL

## 2024-11-21 ENCOUNTER — TELEPHONE (OUTPATIENT)
Age: 39
End: 2024-11-21
Payer: COMMERCIAL

## 2024-11-21 ENCOUNTER — ROUTINE PRENATAL (OUTPATIENT)
Age: 39
End: 2024-11-21
Payer: COMMERCIAL

## 2024-11-21 VITALS — DIASTOLIC BLOOD PRESSURE: 78 MMHG | SYSTOLIC BLOOD PRESSURE: 120 MMHG | WEIGHT: 203.2 LBS | BODY MASS INDEX: 31.83 KG/M2

## 2024-11-21 DIAGNOSIS — O44.03 PLACENTA PREVIA, THIRD TRIMESTER: Primary | ICD-10-CM

## 2024-11-21 DIAGNOSIS — Z67.91 RH NEGATIVE, ANTEPARTUM, THIRD TRIMESTER: ICD-10-CM

## 2024-11-21 DIAGNOSIS — O24.410 DIET CONTROLLED GESTATIONAL DIABETES MELLITUS (GDM), ANTEPARTUM: ICD-10-CM

## 2024-11-21 DIAGNOSIS — O09.523 MULTIGRAVIDA OF ADVANCED MATERNAL AGE IN THIRD TRIMESTER: ICD-10-CM

## 2024-11-21 DIAGNOSIS — O26.893 RH NEGATIVE, ANTEPARTUM, THIRD TRIMESTER: ICD-10-CM

## 2024-11-21 RX ORDER — SODIUM CHLORIDE 0.9 % (FLUSH) 0.9 %
10 SYRINGE (ML) INJECTION EVERY 12 HOURS SCHEDULED
OUTPATIENT
Start: 2024-11-21

## 2024-11-21 RX ORDER — BLOOD-GLUCOSE METER
EACH MISCELLANEOUS
COMMUNITY
Start: 2024-10-31

## 2024-11-21 RX ORDER — METHYLERGONOVINE MALEATE 0.2 MG/ML
200 INJECTION INTRAVENOUS ONCE AS NEEDED
OUTPATIENT
Start: 2024-11-21

## 2024-11-21 RX ORDER — KETOROLAC TROMETHAMINE 15 MG/ML
30 INJECTION, SOLUTION INTRAMUSCULAR; INTRAVENOUS ONCE
OUTPATIENT
Start: 2024-11-21 | End: 2024-11-21

## 2024-11-21 RX ORDER — HYDROMORPHONE HYDROCHLORIDE 1 MG/ML
0.5 INJECTION, SOLUTION INTRAMUSCULAR; INTRAVENOUS; SUBCUTANEOUS
OUTPATIENT
Start: 2024-11-21 | End: 2024-12-01

## 2024-11-21 RX ORDER — SODIUM CHLORIDE 9 MG/ML
40 INJECTION, SOLUTION INTRAVENOUS AS NEEDED
OUTPATIENT
Start: 2024-11-21

## 2024-11-21 RX ORDER — SODIUM CHLORIDE 0.9 % (FLUSH) 0.9 %
10 SYRINGE (ML) INJECTION AS NEEDED
OUTPATIENT
Start: 2024-11-21

## 2024-11-21 RX ORDER — ACETAMINOPHEN 500 MG
1000 TABLET ORAL ONCE
OUTPATIENT
Start: 2024-11-21 | End: 2024-11-21

## 2024-11-21 RX ORDER — CARBOPROST TROMETHAMINE 250 UG/ML
250 INJECTION, SOLUTION INTRAMUSCULAR AS NEEDED
OUTPATIENT
Start: 2024-11-21

## 2024-11-21 RX ORDER — FAMOTIDINE 10 MG/ML
20 INJECTION, SOLUTION INTRAVENOUS ONCE AS NEEDED
OUTPATIENT
Start: 2024-11-21

## 2024-11-21 RX ORDER — ONDANSETRON 4 MG/1
4 TABLET, ORALLY DISINTEGRATING ORAL EVERY 6 HOURS PRN
OUTPATIENT
Start: 2024-11-21

## 2024-11-21 RX ORDER — ONDANSETRON 2 MG/ML
4 INJECTION INTRAMUSCULAR; INTRAVENOUS EVERY 6 HOURS PRN
OUTPATIENT
Start: 2024-11-21

## 2024-11-21 RX ORDER — OXYTOCIN/0.9 % SODIUM CHLORIDE 30/500 ML
999 PLASTIC BAG, INJECTION (ML) INTRAVENOUS ONCE
OUTPATIENT
Start: 2024-11-21 | End: 2024-11-21

## 2024-11-21 RX ORDER — SODIUM CHLORIDE, SODIUM LACTATE, POTASSIUM CHLORIDE, CALCIUM CHLORIDE 600; 310; 30; 20 MG/100ML; MG/100ML; MG/100ML; MG/100ML
125 INJECTION, SOLUTION INTRAVENOUS CONTINUOUS
OUTPATIENT
Start: 2024-11-21 | End: 2024-11-22

## 2024-11-21 RX ORDER — OMEPRAZOLE 10 MG/1
10 CAPSULE, DELAYED RELEASE ORAL DAILY
COMMUNITY

## 2024-11-21 RX ORDER — FAMOTIDINE 10 MG
20 TABLET ORAL ONCE AS NEEDED
OUTPATIENT
Start: 2024-11-21

## 2024-11-21 RX ORDER — OXYTOCIN/0.9 % SODIUM CHLORIDE 30/500 ML
250 PLASTIC BAG, INJECTION (ML) INTRAVENOUS CONTINUOUS
OUTPATIENT
Start: 2024-11-21 | End: 2024-11-21

## 2024-11-21 RX ORDER — MISOPROSTOL 100 UG/1
800 TABLET ORAL ONCE AS NEEDED
OUTPATIENT
Start: 2024-11-21

## 2024-11-21 NOTE — H&P (VIEW-ONLY)
[facility]  Sonia Armstrong  : 1985  MRN: [id number]  CSN: [id number]    History and Physical    Subjective   Sonia Armstrong is a 39 y.o. year old  with an Estimated Date of Delivery: 25 scheduled on  for  delivery due to placenta previa.  She is planning for sterilization at the time of the .    Prenatal care has been with Dr. Ash Dacosta.  It has been complicated by posterior placenta previa, AMA, and diet controlled GDM .    OB History    Para Term  AB Living   3 2 2 0 0 2   SAB IAB Ectopic Molar Multiple Live Births   0 0 0 0 0 2      # Outcome Date GA Lbr Tito/2nd Weight Sex Type Anes PTL Lv   3 Current            2 Term      Vag-Spont   NIGEL   1 Term      Vag-Spont   NIGEL     Past Medical History:   Diagnosis Date    Basal cell carcinoma of lower eyelid, right     Gestational diabetes      Past Surgical History:   Procedure Laterality Date    AUGMENTATION MAMMAPLASTY Bilateral     BREAST BIOPSY         Current Outpatient Medications:     aspirin 81 MG EC tablet, Take 1 tablet by mouth Daily., Disp: , Rfl:     Blood Glucose Monitoring Suppl (Prodigy Autocode Blood Glucose) w/Device kit, , Disp: , Rfl:     cetirizine (ZyrTEC Allergy) 10 MG tablet, Take 1 tablet by mouth Daily., Disp: , Rfl:     ferrous sulfate 325 (65 FE) MG tablet, Take 1 tablet by mouth Daily With Breakfast., Disp: , Rfl:     glucose blood test strip, Pt to test QID, fasting and then 1h after each meal, Disp: 200 each, Rfl: 2    glucose monitor monitoring kit, Pt to test QID, fasting and then 1h after each meal, Disp: 1 each, Rfl: 0    Lancets misc, Pt to test QID, fasting and then 1h after each meal, Disp: 200 each, Rfl: 2    omeprazole (prilOSEC) 10 MG capsule, Take 1 capsule by mouth Daily., Disp: , Rfl:     prenatal vitamin (prenatal, CLASSIC, vitamin) tablet, Take  by mouth Daily., Disp: , Rfl:     sertraline (ZOLOFT) 50 MG tablet, , Disp: , Rfl:   Family History   Problem  Relation Age of Onset    Hypertension Father     Breast cancer Paternal Grandmother        No Known Allergies  Social History    Tobacco Use      Smoking status: Never      Smokeless tobacco: Never    Review of Systems      Objective   /78   Wt 92.2 kg (203 lb 3.2 oz)   LMP 2024   BMI 31.83 kg/m²   General: well developed; well nourished  no acute distress  mentation appropriate   Heart: regular rate and rhythm   Lungs: breathing is unlabored   Abdomen: soft, non-tender; no masses     Cervix:   presenting part vertex  Prenatal Labs  Lab Results   Component Value Date    HGB 9.3 (L) 2024    HEPBSAG Negative 05/10/2024    ABO A 2024    RH Negative 2024    ABSCRN Positive 2024    [id number] Non Reactive 05/10/2024    HEPCVIRUSABY Non Reactive 05/10/2024    URINECX Final report 05/10/2024       Recent Labs  Lab Results   Component Value Date    HGB 9.3 (L) 2024    HCT 29.6 (L) 2024    WBC 11.45 (H) 2024     2024           Assessment   IUP with an Estimated Date of Delivery: 25  Planned  section on  for posterior placenta previa.  Undesired fertility     Plan   Primary  with bilateral tubal sterilization    Risks, benefits, and alternatives to  section were discussed with the patient at  length.  The surgical nature of  section was discussed.  The indications for  section were discussed.  Risks of bleeding, infection, and damage to surrounding organs were reviewed.  Injury to blood vessels, the urinary bladder, the ureter, and the intestines were all reviewed.  Management of these complications were reviewed.    Risks, benefits, and alternatives of permanent sterilization were discussed with the patient in detail. Intraoperative risks of bleeding and damage to surrounding organs, including but not limited to intestine, bladder and ureter, were explained. Management of these were also explained.  Postoperative complications such as infection, pneumonia, DVT, and bleeding were explained. The importance of compliance with postoperative restrictions was discussed. Success and failure rates were discussed. Increased risk of ectopic pregnancy was explained. In addition, reversible forms of contraception were reviewed such as the pill, the patch, the shot, and the IUD.     All of the patient's questions were answered to her satisfaction. She was encouraged to return for an additional appointment if she had further questions. She verbalized understanding of the above and wished to proceed with the outlined plan.      Moises Dacosta MD  11/21/2024

## 2024-11-21 NOTE — H&P
[facility]  Sonia Armstrong  : 1985  MRN: [id number]  CSN: [id number]    History and Physical    Subjective   Sonia Armstrong is a 39 y.o. year old  with an Estimated Date of Delivery: 25 scheduled on  for  delivery due to placenta previa.  She is planning for sterilization at the time of the .    Prenatal care has been with Dr. Ash Dacosta.  It has been complicated by posterior placenta previa, AMA, and diet controlled GDM .    OB History    Para Term  AB Living   3 2 2 0 0 2   SAB IAB Ectopic Molar Multiple Live Births   0 0 0 0 0 2      # Outcome Date GA Lbr Tito/2nd Weight Sex Type Anes PTL Lv   3 Current            2 Term      Vag-Spont   NIGEL   1 Term      Vag-Spont   NIGEL     Past Medical History:   Diagnosis Date    Basal cell carcinoma of lower eyelid, right     Gestational diabetes      Past Surgical History:   Procedure Laterality Date    AUGMENTATION MAMMAPLASTY Bilateral     BREAST BIOPSY         Current Outpatient Medications:     aspirin 81 MG EC tablet, Take 1 tablet by mouth Daily., Disp: , Rfl:     Blood Glucose Monitoring Suppl (Prodigy Autocode Blood Glucose) w/Device kit, , Disp: , Rfl:     cetirizine (ZyrTEC Allergy) 10 MG tablet, Take 1 tablet by mouth Daily., Disp: , Rfl:     ferrous sulfate 325 (65 FE) MG tablet, Take 1 tablet by mouth Daily With Breakfast., Disp: , Rfl:     glucose blood test strip, Pt to test QID, fasting and then 1h after each meal, Disp: 200 each, Rfl: 2    glucose monitor monitoring kit, Pt to test QID, fasting and then 1h after each meal, Disp: 1 each, Rfl: 0    Lancets misc, Pt to test QID, fasting and then 1h after each meal, Disp: 200 each, Rfl: 2    omeprazole (prilOSEC) 10 MG capsule, Take 1 capsule by mouth Daily., Disp: , Rfl:     prenatal vitamin (prenatal, CLASSIC, vitamin) tablet, Take  by mouth Daily., Disp: , Rfl:     sertraline (ZOLOFT) 50 MG tablet, , Disp: , Rfl:   Family History   Problem  Relation Age of Onset    Hypertension Father     Breast cancer Paternal Grandmother        No Known Allergies  Social History    Tobacco Use      Smoking status: Never      Smokeless tobacco: Never    Review of Systems      Objective   /78   Wt 92.2 kg (203 lb 3.2 oz)   LMP 2024   BMI 31.83 kg/m²   General: well developed; well nourished  no acute distress  mentation appropriate   Heart: regular rate and rhythm   Lungs: breathing is unlabored   Abdomen: soft, non-tender; no masses     Cervix:   presenting part vertex  Prenatal Labs  Lab Results   Component Value Date    HGB 9.3 (L) 2024    HEPBSAG Negative 05/10/2024    ABO A 2024    RH Negative 2024    ABSCRN Positive 2024    [id number] Non Reactive 05/10/2024    HEPCVIRUSABY Non Reactive 05/10/2024    URINECX Final report 05/10/2024       Recent Labs  Lab Results   Component Value Date    HGB 9.3 (L) 2024    HCT 29.6 (L) 2024    WBC 11.45 (H) 2024     2024           Assessment   IUP with an Estimated Date of Delivery: 25  Planned  section on  for posterior placenta previa.  Undesired fertility     Plan   Primary  with bilateral tubal sterilization    Risks, benefits, and alternatives to  section were discussed with the patient at  length.  The surgical nature of  section was discussed.  The indications for  section were discussed.  Risks of bleeding, infection, and damage to surrounding organs were reviewed.  Injury to blood vessels, the urinary bladder, the ureter, and the intestines were all reviewed.  Management of these complications were reviewed.    Risks, benefits, and alternatives of permanent sterilization were discussed with the patient in detail. Intraoperative risks of bleeding and damage to surrounding organs, including but not limited to intestine, bladder and ureter, were explained. Management of these were also explained.  Postoperative complications such as infection, pneumonia, DVT, and bleeding were explained. The importance of compliance with postoperative restrictions was discussed. Success and failure rates were discussed. Increased risk of ectopic pregnancy was explained. In addition, reversible forms of contraception were reviewed such as the pill, the patch, the shot, and the IUD.     All of the patient's questions were answered to her satisfaction. She was encouraged to return for an additional appointment if she had further questions. She verbalized understanding of the above and wished to proceed with the outlined plan.      Moises Dacosta MD  11/21/2024

## 2024-11-21 NOTE — PROGRESS NOTES
Good fetal movement  No further vaginal bleeding after hospitalization 2 weeks ago  Has had a few very mild contractions  FSBS reviewed normal except a couple of outliers  US today BPP 8/8, ATIYA 23cm, EFW 3031g  Surg pack done for 12/5, desires BTL  Labor instructions    Diagnoses and all orders for this visit:    1. Placenta previa, third trimester (Primary)    2. Rh negative, antepartum, third trimester    3. Multigravida of advanced maternal age in third trimester    4. Diet controlled gestational diabetes mellitus (GDM), antepartum

## 2024-11-21 NOTE — TELEPHONE ENCOUNTER
TL added to Primary C/S on 12/5/24. Spoke with Marjorie in scheduling. Zeke Rogers notified of added procedure as well.

## 2024-11-26 ENCOUNTER — ANESTHESIA EVENT (OUTPATIENT)
Dept: LABOR AND DELIVERY | Facility: HOSPITAL | Age: 39
End: 2024-11-26
Payer: COMMERCIAL

## 2024-11-26 ENCOUNTER — ROUTINE PRENATAL (OUTPATIENT)
Age: 39
End: 2024-11-26
Payer: COMMERCIAL

## 2024-11-26 VITALS — WEIGHT: 205 LBS | DIASTOLIC BLOOD PRESSURE: 92 MMHG | BODY MASS INDEX: 32.11 KG/M2 | SYSTOLIC BLOOD PRESSURE: 132 MMHG

## 2024-11-26 DIAGNOSIS — O09.523 MULTIGRAVIDA OF ADVANCED MATERNAL AGE IN THIRD TRIMESTER: ICD-10-CM

## 2024-11-26 DIAGNOSIS — Z67.91 RH NEGATIVE, ANTEPARTUM, THIRD TRIMESTER: ICD-10-CM

## 2024-11-26 DIAGNOSIS — O26.893 RH NEGATIVE, ANTEPARTUM, THIRD TRIMESTER: ICD-10-CM

## 2024-11-26 DIAGNOSIS — O44.03 PLACENTA PREVIA, THIRD TRIMESTER: Primary | ICD-10-CM

## 2024-11-26 DIAGNOSIS — O24.410 DIET CONTROLLED GESTATIONAL DIABETES MELLITUS (GDM), ANTEPARTUM: ICD-10-CM

## 2024-11-26 PROCEDURE — 0502F SUBSEQUENT PRENATAL CARE: CPT | Performed by: OBSTETRICS & GYNECOLOGY

## 2024-11-26 NOTE — PROGRESS NOTES
Good fetal movement  Few contractions, but no vaginal bleeding  FSBS normal  US today BPP 8/8, ATIYA 20cm  Labor instructions    Diagnoses and all orders for this visit:    1. Placenta previa, third trimester (Primary)    2. Rh negative, antepartum, third trimester    3. Diet controlled gestational diabetes mellitus (GDM), antepartum    4. Multigravida of advanced maternal age in third trimester

## 2024-12-02 ENCOUNTER — ROUTINE PRENATAL (OUTPATIENT)
Age: 39
End: 2024-12-02
Payer: COMMERCIAL

## 2024-12-02 VITALS — DIASTOLIC BLOOD PRESSURE: 76 MMHG | BODY MASS INDEX: 32.42 KG/M2 | WEIGHT: 207 LBS | SYSTOLIC BLOOD PRESSURE: 128 MMHG

## 2024-12-02 DIAGNOSIS — O44.03 PLACENTA PREVIA, THIRD TRIMESTER: Primary | ICD-10-CM

## 2024-12-02 DIAGNOSIS — O24.410 DIET CONTROLLED GESTATIONAL DIABETES MELLITUS (GDM), ANTEPARTUM: ICD-10-CM

## 2024-12-02 DIAGNOSIS — O26.893 RH NEGATIVE, ANTEPARTUM, THIRD TRIMESTER: ICD-10-CM

## 2024-12-02 DIAGNOSIS — Z67.91 RH NEGATIVE, ANTEPARTUM, THIRD TRIMESTER: ICD-10-CM

## 2024-12-02 PROCEDURE — 0502F SUBSEQUENT PRENATAL CARE: CPT | Performed by: OBSTETRICS & GYNECOLOGY

## 2024-12-02 NOTE — PROGRESS NOTES
Good fetal movement  No contractions, no bleeding  US today BPP 8/8, ATIYA 24.7cm  Labor precautions, plan  in 3 days    Diagnoses and all orders for this visit:    1. Placenta previa, third trimester (Primary)    2. Rh negative, antepartum, third trimester    3. Diet controlled gestational diabetes mellitus (GDM), antepartum

## 2024-12-05 ENCOUNTER — HOSPITAL ENCOUNTER (INPATIENT)
Facility: HOSPITAL | Age: 39
LOS: 3 days | Discharge: HOME | End: 2024-12-08
Attending: OBSTETRICS & GYNECOLOGY | Admitting: OBSTETRICS & GYNECOLOGY
Payer: COMMERCIAL

## 2024-12-05 ENCOUNTER — ANESTHESIA (OUTPATIENT)
Dept: LABOR AND DELIVERY | Facility: HOSPITAL | Age: 39
End: 2024-12-05
Payer: COMMERCIAL

## 2024-12-05 DIAGNOSIS — O44.03 PLACENTA PREVIA, THIRD TRIMESTER: ICD-10-CM

## 2024-12-05 PROBLEM — O44.00 PLACENTA PREVIA ANTEPARTUM: Status: RESOLVED | Noted: 2024-12-05 | Resolved: 2024-12-05

## 2024-12-05 PROBLEM — O44.00 PLACENTA PREVIA ANTEPARTUM: Status: ACTIVE | Noted: 2024-12-05

## 2024-12-05 PROBLEM — Z91.89: Status: RESOLVED | Noted: 2024-11-11 | Resolved: 2024-12-05

## 2024-12-05 PROBLEM — O44.10 ANTEPARTUM HEMORRHAGE FROM PLACENTA PREVIA: Status: RESOLVED | Noted: 2024-11-11 | Resolved: 2024-12-05

## 2024-12-05 LAB
ABO GROUP BLD: NORMAL
ANTI-D: NORMAL
BLD GP AB SCN SERPL QL: POSITIVE
DEPRECATED RDW RBC AUTO: 41.4 FL (ref 37–54)
ERYTHROCYTE [DISTWIDTH] IN BLOOD BY AUTOMATED COUNT: 14.6 % (ref 12.3–15.4)
GLUCOSE BLDC GLUCOMTR-MCNC: 94 MG/DL (ref 70–130)
HCT VFR BLD AUTO: 29.5 % (ref 34–46.6)
HGB BLD-MCNC: 9.4 G/DL (ref 12–15.9)
MCH RBC QN AUTO: 25.1 PG (ref 26.6–33)
MCHC RBC AUTO-ENTMCNC: 31.9 G/DL (ref 31.5–35.7)
MCV RBC AUTO: 78.7 FL (ref 79–97)
PLATELET # BLD AUTO: 321 10*3/MM3 (ref 140–450)
PMV BLD AUTO: 9.1 FL (ref 6–12)
RBC # BLD AUTO: 3.75 10*6/MM3 (ref 3.77–5.28)
RH BLD: NEGATIVE
T&S EXPIRATION DATE: NORMAL
TREPONEMA PALLIDUM IGG+IGM AB [PRESENCE] IN SERUM OR PLASMA BY IMMUNOASSAY: NORMAL
WBC NRBC COR # BLD AUTO: 10.76 10*3/MM3 (ref 3.4–10.8)

## 2024-12-05 PROCEDURE — 25010000002 CEFAZOLIN PER 500 MG: Performed by: OBSTETRICS & GYNECOLOGY

## 2024-12-05 PROCEDURE — 51702 INSERT TEMP BLADDER CATH: CPT

## 2024-12-05 PROCEDURE — 88302 TISSUE EXAM BY PATHOLOGIST: CPT | Performed by: OBSTETRICS & GYNECOLOGY

## 2024-12-05 PROCEDURE — 85027 COMPLETE CBC AUTOMATED: CPT | Performed by: OBSTETRICS & GYNECOLOGY

## 2024-12-05 PROCEDURE — 86901 BLOOD TYPING SEROLOGIC RH(D): CPT | Performed by: OBSTETRICS & GYNECOLOGY

## 2024-12-05 PROCEDURE — 86870 RBC ANTIBODY IDENTIFICATION: CPT | Performed by: OBSTETRICS & GYNECOLOGY

## 2024-12-05 PROCEDURE — 86922 COMPATIBILITY TEST ANTIGLOB: CPT

## 2024-12-05 PROCEDURE — 25010000002 OXYTOCIN PER 10 UNITS: Performed by: NURSE ANESTHETIST, CERTIFIED REGISTERED

## 2024-12-05 PROCEDURE — 25810000003 LACTATED RINGERS SOLUTION: Performed by: OBSTETRICS & GYNECOLOGY

## 2024-12-05 PROCEDURE — 25010000002 DEXAMETHASONE PER 1 MG: Performed by: NURSE ANESTHETIST, CERTIFIED REGISTERED

## 2024-12-05 PROCEDURE — 25010000002 HYDROMORPHONE PER 4 MG: Performed by: OBSTETRICS & GYNECOLOGY

## 2024-12-05 PROCEDURE — 82948 REAGENT STRIP/BLOOD GLUCOSE: CPT

## 2024-12-05 PROCEDURE — 25810000003 LACTATED RINGERS PER 1000 ML: Performed by: OBSTETRICS & GYNECOLOGY

## 2024-12-05 PROCEDURE — 59510 CESAREAN DELIVERY: CPT | Performed by: OBSTETRICS & GYNECOLOGY

## 2024-12-05 PROCEDURE — 0UB70ZZ EXCISION OF BILATERAL FALLOPIAN TUBES, OPEN APPROACH: ICD-10-PCS | Performed by: OBSTETRICS & GYNECOLOGY

## 2024-12-05 PROCEDURE — 25010000002 HYDROMORPHONE 1 MG/ML SOLUTION: Performed by: NURSE ANESTHETIST, CERTIFIED REGISTERED

## 2024-12-05 PROCEDURE — 86850 RBC ANTIBODY SCREEN: CPT

## 2024-12-05 PROCEDURE — 25010000002 BUPIVACAINE IN DEXTROSE 0.75-8.25 % SOLUTION: Performed by: NURSE ANESTHETIST, CERTIFIED REGISTERED

## 2024-12-05 PROCEDURE — 25010000002 ONDANSETRON PER 1 MG: Performed by: NURSE ANESTHETIST, CERTIFIED REGISTERED

## 2024-12-05 PROCEDURE — 25010000002 METOCLOPRAMIDE PER 10 MG: Performed by: NURSE ANESTHETIST, CERTIFIED REGISTERED

## 2024-12-05 PROCEDURE — 86780 TREPONEMA PALLIDUM: CPT | Performed by: OBSTETRICS & GYNECOLOGY

## 2024-12-05 PROCEDURE — 86900 BLOOD TYPING SEROLOGIC ABO: CPT | Performed by: OBSTETRICS & GYNECOLOGY

## 2024-12-05 PROCEDURE — 86850 RBC ANTIBODY SCREEN: CPT | Performed by: OBSTETRICS & GYNECOLOGY

## 2024-12-05 PROCEDURE — 94799 UNLISTED PULMONARY SVC/PX: CPT

## 2024-12-05 PROCEDURE — 58611 LIGATE OVIDUCT(S) ADD-ON: CPT | Performed by: OBSTETRICS & GYNECOLOGY

## 2024-12-05 PROCEDURE — 86920 COMPATIBILITY TEST SPIN: CPT

## 2024-12-05 PROCEDURE — 25010000002 KETOROLAC TROMETHAMINE PER 15 MG: Performed by: OBSTETRICS & GYNECOLOGY

## 2024-12-05 RX ORDER — IBUPROFEN 600 MG/1
600 TABLET, FILM COATED ORAL EVERY 6 HOURS
Status: DISCONTINUED | OUTPATIENT
Start: 2024-12-06 | End: 2024-12-08 | Stop reason: HOSPADM

## 2024-12-05 RX ORDER — HYDROMORPHONE HYDROCHLORIDE 1 MG/ML
0.5 INJECTION, SOLUTION INTRAMUSCULAR; INTRAVENOUS; SUBCUTANEOUS
Status: DISCONTINUED | OUTPATIENT
Start: 2024-12-05 | End: 2024-12-05 | Stop reason: HOSPADM

## 2024-12-05 RX ORDER — ONDANSETRON 4 MG/1
4 TABLET, ORALLY DISINTEGRATING ORAL EVERY 6 HOURS PRN
Status: DISCONTINUED | OUTPATIENT
Start: 2024-12-05 | End: 2024-12-05 | Stop reason: HOSPADM

## 2024-12-05 RX ORDER — FAMOTIDINE 20 MG/1
20 TABLET, FILM COATED ORAL ONCE AS NEEDED
Status: DISCONTINUED | OUTPATIENT
Start: 2024-12-05 | End: 2024-12-05 | Stop reason: HOSPADM

## 2024-12-05 RX ORDER — METHYLERGONOVINE MALEATE 0.2 MG/ML
200 INJECTION INTRAVENOUS AS NEEDED
Status: DISCONTINUED | OUTPATIENT
Start: 2024-12-05 | End: 2024-12-08 | Stop reason: HOSPADM

## 2024-12-05 RX ORDER — CITRIC ACID/SODIUM CITRATE 334-500MG
15 SOLUTION, ORAL ORAL ONCE
Status: COMPLETED | OUTPATIENT
Start: 2024-12-05 | End: 2024-12-05

## 2024-12-05 RX ORDER — SODIUM CHLORIDE 0.9 % (FLUSH) 0.9 %
10 SYRINGE (ML) INJECTION AS NEEDED
Status: DISCONTINUED | OUTPATIENT
Start: 2024-12-05 | End: 2024-12-05 | Stop reason: HOSPADM

## 2024-12-05 RX ORDER — OXYTOCIN/0.9 % SODIUM CHLORIDE 30/500 ML
125 PLASTIC BAG, INJECTION (ML) INTRAVENOUS ONCE AS NEEDED
Status: DISCONTINUED | OUTPATIENT
Start: 2024-12-05 | End: 2024-12-08 | Stop reason: HOSPADM

## 2024-12-05 RX ORDER — SODIUM CHLORIDE 9 MG/ML
40 INJECTION, SOLUTION INTRAVENOUS AS NEEDED
Status: DISCONTINUED | OUTPATIENT
Start: 2024-12-05 | End: 2024-12-05 | Stop reason: HOSPADM

## 2024-12-05 RX ORDER — PHENYLEPHRINE HCL IN 0.9% NACL 1 MG/10 ML
SYRINGE (ML) INTRAVENOUS AS NEEDED
Status: DISCONTINUED | OUTPATIENT
Start: 2024-12-05 | End: 2024-12-05 | Stop reason: SURG

## 2024-12-05 RX ORDER — MISOPROSTOL 200 UG/1
800 TABLET ORAL ONCE AS NEEDED
Status: DISCONTINUED | OUTPATIENT
Start: 2024-12-05 | End: 2024-12-05 | Stop reason: HOSPADM

## 2024-12-05 RX ORDER — OXYTOCIN/0.9 % SODIUM CHLORIDE 30/500 ML
999 PLASTIC BAG, INJECTION (ML) INTRAVENOUS ONCE
Status: COMPLETED | OUTPATIENT
Start: 2024-12-05 | End: 2024-12-05

## 2024-12-05 RX ORDER — OXYTOCIN/0.9 % SODIUM CHLORIDE 30/500 ML
250 PLASTIC BAG, INJECTION (ML) INTRAVENOUS CONTINUOUS
Status: ACTIVE | OUTPATIENT
Start: 2024-12-05 | End: 2024-12-05

## 2024-12-05 RX ORDER — ONDANSETRON 2 MG/ML
4 INJECTION INTRAMUSCULAR; INTRAVENOUS EVERY 6 HOURS PRN
Status: DISCONTINUED | OUTPATIENT
Start: 2024-12-05 | End: 2024-12-05 | Stop reason: SDUPTHER

## 2024-12-05 RX ORDER — METOCLOPRAMIDE HYDROCHLORIDE 5 MG/ML
10 INJECTION INTRAMUSCULAR; INTRAVENOUS ONCE
Status: COMPLETED | OUTPATIENT
Start: 2024-12-05 | End: 2024-12-05

## 2024-12-05 RX ORDER — MISOPROSTOL 200 UG/1
800 TABLET ORAL ONCE AS NEEDED
Status: DISCONTINUED | OUTPATIENT
Start: 2024-12-05 | End: 2024-12-08 | Stop reason: HOSPADM

## 2024-12-05 RX ORDER — CARBOPROST TROMETHAMINE 250 UG/ML
250 INJECTION, SOLUTION INTRAMUSCULAR ONCE AS NEEDED
Status: DISCONTINUED | OUTPATIENT
Start: 2024-12-05 | End: 2024-12-08 | Stop reason: HOSPADM

## 2024-12-05 RX ORDER — ONDANSETRON 2 MG/ML
4 INJECTION INTRAMUSCULAR; INTRAVENOUS EVERY 6 HOURS PRN
Status: DISCONTINUED | OUTPATIENT
Start: 2024-12-05 | End: 2024-12-05 | Stop reason: HOSPADM

## 2024-12-05 RX ORDER — FAMOTIDINE 10 MG/ML
20 INJECTION, SOLUTION INTRAVENOUS ONCE AS NEEDED
Status: COMPLETED | OUTPATIENT
Start: 2024-12-05 | End: 2024-12-05

## 2024-12-05 RX ORDER — SODIUM CHLORIDE, SODIUM LACTATE, POTASSIUM CHLORIDE, CALCIUM CHLORIDE 600; 310; 30; 20 MG/100ML; MG/100ML; MG/100ML; MG/100ML
125 INJECTION, SOLUTION INTRAVENOUS CONTINUOUS
Status: DISPENSED | OUTPATIENT
Start: 2024-12-05 | End: 2024-12-06

## 2024-12-05 RX ORDER — BUPIVACAINE HYDROCHLORIDE 7.5 MG/ML
INJECTION, SOLUTION INTRASPINAL AS NEEDED
Status: DISCONTINUED | OUTPATIENT
Start: 2024-12-05 | End: 2024-12-05 | Stop reason: SURG

## 2024-12-05 RX ORDER — CETIRIZINE HYDROCHLORIDE 10 MG/1
10 TABLET ORAL DAILY
Status: DISCONTINUED | OUTPATIENT
Start: 2024-12-05 | End: 2024-12-08 | Stop reason: HOSPADM

## 2024-12-05 RX ORDER — SODIUM CHLORIDE 0.9 % (FLUSH) 0.9 %
10 SYRINGE (ML) INJECTION EVERY 12 HOURS SCHEDULED
Status: DISCONTINUED | OUTPATIENT
Start: 2024-12-05 | End: 2024-12-05 | Stop reason: HOSPADM

## 2024-12-05 RX ORDER — SIMETHICONE 80 MG
80 TABLET,CHEWABLE ORAL 4 TIMES DAILY PRN
Status: DISCONTINUED | OUTPATIENT
Start: 2024-12-05 | End: 2024-12-08 | Stop reason: HOSPADM

## 2024-12-05 RX ORDER — MISOPROSTOL 200 UG/1
800 TABLET ORAL ONCE
Status: COMPLETED | OUTPATIENT
Start: 2024-12-05 | End: 2024-12-05

## 2024-12-05 RX ORDER — ACETAMINOPHEN 500 MG
1000 TABLET ORAL EVERY 6 HOURS
Status: COMPLETED | OUTPATIENT
Start: 2024-12-05 | End: 2024-12-06

## 2024-12-05 RX ORDER — NALOXONE HCL 0.4 MG/ML
0.4 VIAL (ML) INJECTION
Status: ACTIVE | OUTPATIENT
Start: 2024-12-05 | End: 2024-12-06

## 2024-12-05 RX ORDER — ACETAMINOPHEN 325 MG/1
650 TABLET ORAL EVERY 6 HOURS
Status: DISCONTINUED | OUTPATIENT
Start: 2024-12-06 | End: 2024-12-08 | Stop reason: HOSPADM

## 2024-12-05 RX ORDER — METOCLOPRAMIDE HYDROCHLORIDE 5 MG/ML
10 INJECTION INTRAMUSCULAR; INTRAVENOUS EVERY 4 HOURS PRN
Status: ACTIVE | OUTPATIENT
Start: 2024-12-05 | End: 2024-12-06

## 2024-12-05 RX ORDER — ONDANSETRON 2 MG/ML
4 INJECTION INTRAMUSCULAR; INTRAVENOUS EVERY 6 HOURS PRN
Status: DISCONTINUED | OUTPATIENT
Start: 2024-12-05 | End: 2024-12-08 | Stop reason: HOSPADM

## 2024-12-05 RX ORDER — ONDANSETRON 4 MG/1
4 TABLET, ORALLY DISINTEGRATING ORAL EVERY 8 HOURS PRN
Status: DISCONTINUED | OUTPATIENT
Start: 2024-12-05 | End: 2024-12-08 | Stop reason: HOSPADM

## 2024-12-05 RX ORDER — ACETAMINOPHEN 500 MG
1000 TABLET ORAL ONCE
Status: COMPLETED | OUTPATIENT
Start: 2024-12-05 | End: 2024-12-05

## 2024-12-05 RX ORDER — NALBUPHINE HYDROCHLORIDE 10 MG/ML
2.5 INJECTION INTRAMUSCULAR; INTRAVENOUS; SUBCUTANEOUS EVERY 4 HOURS PRN
Status: DISCONTINUED | OUTPATIENT
Start: 2024-12-05 | End: 2024-12-05

## 2024-12-05 RX ORDER — OXYCODONE HYDROCHLORIDE 5 MG/1
5 TABLET ORAL EVERY 4 HOURS PRN
Status: DISCONTINUED | OUTPATIENT
Start: 2024-12-05 | End: 2024-12-08 | Stop reason: HOSPADM

## 2024-12-05 RX ORDER — OXYTOCIN 10 [USP'U]/ML
INJECTION, SOLUTION INTRAMUSCULAR; INTRAVENOUS AS NEEDED
Status: DISCONTINUED | OUTPATIENT
Start: 2024-12-05 | End: 2024-12-05 | Stop reason: SURG

## 2024-12-05 RX ORDER — CARBOPROST TROMETHAMINE 250 UG/ML
250 INJECTION, SOLUTION INTRAMUSCULAR AS NEEDED
Status: DISCONTINUED | OUTPATIENT
Start: 2024-12-05 | End: 2024-12-05 | Stop reason: HOSPADM

## 2024-12-05 RX ORDER — HYDROMORPHONE HYDROCHLORIDE 1 MG/ML
0.5 INJECTION, SOLUTION INTRAMUSCULAR; INTRAVENOUS; SUBCUTANEOUS
Status: DISCONTINUED | OUTPATIENT
Start: 2024-12-05 | End: 2024-12-06 | Stop reason: ALTCHOICE

## 2024-12-05 RX ORDER — DOCUSATE SODIUM 100 MG/1
100 CAPSULE, LIQUID FILLED ORAL 2 TIMES DAILY PRN
Status: DISCONTINUED | OUTPATIENT
Start: 2024-12-05 | End: 2024-12-08 | Stop reason: HOSPADM

## 2024-12-05 RX ORDER — KETOROLAC TROMETHAMINE 30 MG/ML
30 INJECTION, SOLUTION INTRAMUSCULAR; INTRAVENOUS ONCE
Status: COMPLETED | OUTPATIENT
Start: 2024-12-05 | End: 2024-12-05

## 2024-12-05 RX ORDER — ONDANSETRON 2 MG/ML
4 INJECTION INTRAMUSCULAR; INTRAVENOUS ONCE AS NEEDED
Status: COMPLETED | OUTPATIENT
Start: 2024-12-05 | End: 2024-12-05

## 2024-12-05 RX ORDER — FAMOTIDINE 10 MG/ML
20 INJECTION, SOLUTION INTRAVENOUS ONCE AS NEEDED
Status: DISCONTINUED | OUTPATIENT
Start: 2024-12-05 | End: 2024-12-05 | Stop reason: HOSPADM

## 2024-12-05 RX ORDER — KETOROLAC TROMETHAMINE 15 MG/ML
15 INJECTION, SOLUTION INTRAMUSCULAR; INTRAVENOUS EVERY 6 HOURS
Status: COMPLETED | OUTPATIENT
Start: 2024-12-05 | End: 2024-12-06

## 2024-12-05 RX ORDER — DEXAMETHASONE SODIUM PHOSPHATE 4 MG/ML
INJECTION, SOLUTION INTRA-ARTICULAR; INTRALESIONAL; INTRAMUSCULAR; INTRAVENOUS; SOFT TISSUE AS NEEDED
Status: DISCONTINUED | OUTPATIENT
Start: 2024-12-05 | End: 2024-12-05 | Stop reason: SURG

## 2024-12-05 RX ORDER — OXYCODONE HYDROCHLORIDE 5 MG/1
10 TABLET ORAL EVERY 4 HOURS PRN
Status: DISCONTINUED | OUTPATIENT
Start: 2024-12-05 | End: 2024-12-08 | Stop reason: HOSPADM

## 2024-12-05 RX ORDER — METHYLERGONOVINE MALEATE 0.2 MG/ML
200 INJECTION INTRAVENOUS ONCE AS NEEDED
Status: DISCONTINUED | OUTPATIENT
Start: 2024-12-05 | End: 2024-12-05 | Stop reason: HOSPADM

## 2024-12-05 RX ORDER — NALOXONE HCL 0.4 MG/ML
0.1 VIAL (ML) INJECTION
Status: DISCONTINUED | OUTPATIENT
Start: 2024-12-05 | End: 2024-12-06 | Stop reason: ALTCHOICE

## 2024-12-05 RX ORDER — PRENATAL VIT/IRON FUM/FOLIC AC 27MG-0.8MG
1 TABLET ORAL DAILY
Status: DISCONTINUED | OUTPATIENT
Start: 2024-12-05 | End: 2024-12-08 | Stop reason: HOSPADM

## 2024-12-05 RX ADMIN — OXYTOCIN 20 UNITS: 10 INJECTION INTRAVENOUS at 08:07

## 2024-12-05 RX ADMIN — SODIUM CHLORIDE, SODIUM LACTATE, POTASSIUM CHLORIDE, CALCIUM CHLORIDE 125 ML/HR: 20; 30; 600; 310 INJECTION, SOLUTION INTRAVENOUS at 07:21

## 2024-12-05 RX ADMIN — KETOROLAC TROMETHAMINE 30 MG: 30 INJECTION, SOLUTION INTRAMUSCULAR; INTRAVENOUS at 09:49

## 2024-12-05 RX ADMIN — SODIUM CHLORIDE, POTASSIUM CHLORIDE, SODIUM LACTATE AND CALCIUM CHLORIDE 1000 ML: 600; 310; 30; 20 INJECTION, SOLUTION INTRAVENOUS at 06:14

## 2024-12-05 RX ADMIN — Medication 999 ML/HR: at 09:50

## 2024-12-05 RX ADMIN — METOCLOPRAMIDE 10 MG: 5 INJECTION, SOLUTION INTRAMUSCULAR; INTRAVENOUS at 07:15

## 2024-12-05 RX ADMIN — Medication 100 MCG: at 08:29

## 2024-12-05 RX ADMIN — PRENATAL VIT W/ FE FUMARATE-FA TAB 27-0.8 MG 1 TABLET: 27-0.8 TAB at 14:01

## 2024-12-05 RX ADMIN — Medication 50 MCG: at 08:16

## 2024-12-05 RX ADMIN — ACETAMINOPHEN 1000 MG: 500 TABLET, FILM COATED ORAL at 20:46

## 2024-12-05 RX ADMIN — SERTRALINE HYDROCHLORIDE 50 MG: 50 TABLET ORAL at 14:01

## 2024-12-05 RX ADMIN — ACETAMINOPHEN 1000 MG: 500 TABLET, FILM COATED ORAL at 06:15

## 2024-12-05 RX ADMIN — CETIRIZINE HYDROCHLORIDE TABLETS 10 MG: 10 TABLET, FILM COATED ORAL at 14:01

## 2024-12-05 RX ADMIN — KETOROLAC TROMETHAMINE 15 MG: 15 INJECTION, SOLUTION INTRAMUSCULAR; INTRAVENOUS at 23:03

## 2024-12-05 RX ADMIN — CEFAZOLIN 2 G: 2 INJECTION, POWDER, FOR SOLUTION INTRAMUSCULAR; INTRAVENOUS at 07:20

## 2024-12-05 RX ADMIN — BUPIVACAINE HYDROCHLORIDE 1.8 MG: 7.5 INJECTION INTRAVENOUS at 07:50

## 2024-12-05 RX ADMIN — SODIUM CITRATE AND CITRIC ACID MONOHYDRATE 15 ML: 500; 334 SOLUTION ORAL at 07:15

## 2024-12-05 RX ADMIN — Medication 150 MCG: at 07:59

## 2024-12-05 RX ADMIN — ACETAMINOPHEN 1000 MG: 500 TABLET, FILM COATED ORAL at 14:01

## 2024-12-05 RX ADMIN — KETOROLAC TROMETHAMINE 15 MG: 15 INJECTION, SOLUTION INTRAMUSCULAR; INTRAVENOUS at 17:42

## 2024-12-05 RX ADMIN — DEXAMETHASONE SODIUM PHOSPHATE 4 MG: 4 INJECTION, SOLUTION INTRA-ARTICULAR; INTRALESIONAL; INTRAMUSCULAR; INTRAVENOUS; SOFT TISSUE at 08:42

## 2024-12-05 RX ADMIN — HYDROMORPHONE HYDROCHLORIDE 0.5 MG: 1 INJECTION, SOLUTION INTRAMUSCULAR; INTRAVENOUS; SUBCUTANEOUS at 10:47

## 2024-12-05 RX ADMIN — SODIUM CHLORIDE, SODIUM LACTATE, POTASSIUM CHLORIDE, CALCIUM CHLORIDE 125 ML/HR: 20; 30; 600; 310 INJECTION, SOLUTION INTRAVENOUS at 13:28

## 2024-12-05 RX ADMIN — MISOPROSTOL 800 MCG: 200 TABLET ORAL at 08:46

## 2024-12-05 RX ADMIN — HYDROMORPHONE HYDROCHLORIDE 100 MCG: 1 INJECTION, SOLUTION INTRAMUSCULAR; INTRAVENOUS; SUBCUTANEOUS at 07:50

## 2024-12-05 RX ADMIN — FAMOTIDINE 20 MG: 10 INJECTION INTRAVENOUS at 07:15

## 2024-12-05 RX ADMIN — ONDANSETRON 4 MG: 2 INJECTION INTRAMUSCULAR; INTRAVENOUS at 07:30

## 2024-12-05 NOTE — ANESTHESIA PREPROCEDURE EVALUATION
Anesthesia Evaluation     Patient summary reviewed and Nursing notes reviewed   no history of anesthetic complications:   NPO Solid Status: > 8 hours  NPO Liquid Status: > 6 hours           Airway   Mallampati: II  TM distance: >3 FB  Neck ROM: full  Dental - normal exam     Pulmonary - negative pulmonary ROS   Cardiovascular - negative cardio ROS  Exercise tolerance: excellent (>7 METS)        Neuro/Psych- negative ROS  GI/Hepatic/Renal/Endo    (+) diabetes mellitus gestational    Musculoskeletal (-) negative ROS    Abdominal    Substance History - negative use     OB/GYN    (+) Pregnant        Other                    Anesthesia Plan    ASA 2     spinal     (Risks and benefits of spinal discussed with patient including PDPH, infection, nerve injury, bleeding, and possible conversion to GA. Patient verbalizes understanding of risks and wishes to proceed with spinal.        Lab Results       Component                Value               Date                       WBC                      10.76               12/05/2024                 HGB                      9.4 (L)             12/05/2024                 HCT                      29.5 (L)            12/05/2024                 MCV                      78.7 (L)            12/05/2024                 PLT                      321                 12/05/2024            )    Anesthetic plan, risks, benefits, and alternatives have been provided, discussed and informed consent has been obtained with: patient.    CODE STATUS:    Code Status (Patient has no pulse and is not breathing): CPR (Attempt to Resuscitate)  Medical Interventions (Patient has pulse or is breathing): Full Support

## 2024-12-05 NOTE — PLAN OF CARE
Goal Outcome Evaluation:              Outcome Evaluation: Patient to LDR for scheduled primary  section with sterilization for placenta previa.

## 2024-12-05 NOTE — OP NOTE
Foster  Sonia Armstrong  : 1985  MRN: [id number]  CSN: [id number]  Date: 2024    Operative Note        Pre-op Diagnosis:  Placenta previa, third trimester [O44.03]  Pregnancy at 36 weeks gestational age  Undesired fertility   Post-op Diagnosis:  Post-Op Diagnosis Codes:     * Placenta previa, third trimester [O44.03]  Pregnancy at 36 weeks gestational age  Undesired fertility   Procedure: Procedure(s):  Primary  section with bilateral tubal ligation   Surgeon: Surgeon(s):  Moises Dacosta MD       Anesthesia: Spinal     Estimated Blood Loss: 1900   mLs   Fluids: 800   mLs   UOP: 150   mLs   Drains: Oh catheter   ABx: Kefzol     Specimens:  Bilateral fallopian tube segments   Findings: Normal uterus, tubes, and ovaries.    Complications: None       INDICATION: Sonia Armstrong is a 39 y.o. female who presents at 36 weeks for a scheduled  due to placenta previa.  She has expressed desire for surgical sterilization.     PROCEDURE: After informed consent was obtained, the patient was taken to the operating room where spinal anesthesia was administered. Time out procedure was completed and perioperative antibiotics were administered. She was placed in the supine position with leftward tilt and her abdomen was prepped and draped in normal sterile fashion after a Oh catheter was placed by nursing staff.   After confirming adequate anesthesia, a Pfannenstiel incision was made with a scalpel through her old scar.  This was carried down sharply to the underlying fascia which was incised in the midline.  The fascial incision was extended laterally with Sommer scissors.  The fascial edges were then elevated and the underlying rectus muscles dissected off with sharp technique.  The rectus muscles were sharply  in the midline and the underlying peritoneum identified and entered sharply.  The peritoneal incision was then extended and an William-O retractor inserted, taking care  to avoid entrapping the bowel.  A low transverse uterine incision was made with a clean scalpel.  The uterine incision was bluntly extended and amniotomy was performed returning clear fluid.  The head of the infant was delivered through the incision without difficulty and the remainder of the infant delivered with fundal pressure.  The infant was vigorous on the field, the cord was clamped and cut, and the infant handed off to waiting nursery nurse.  Cord blood was obtained and the placenta then expressed.  Bleeding from the lower uterine segment was encountered and treated with uterine massage, intravenous Pitocin, uterine compression, and 800 mcg rectal Cytotec.  The uterus was repaired in situ and cleared of clot and debris with a moist laparotomy sponge.  The uterine incision was closed with a running locked suture of 0 Monocryl. The uterine incision was hemostatic.    Attention was turned to the tubal ligation portion of the procedure.  Each fallopian tube was identified and followed out to its fimbriated end to ensure correct identification.  A portion in the isthmic region of the left tube was grasped with a Eusebio clamp and elevated.  A window was then made in the mesosalpinx with Bovie cautery.  The proximal and distal ends of the tubal segment were then ligated with 0 plain gut suture.  The left tubal segment was sharply excised.  The excision site was made hemostatic with Bovie cautery if needed.  A small area of oozing from the mesosalpinx was made hemostatic with a figure-of-eight suture of 0 plain gut.  A portion in the isthmic region of the right tube was grasped with a Eusebio clamp and elevated.  A window was then made in the mesosalpinx with Bovie cautery.  The proximal and distal ends of the tubal segment were then ligated with 0 plain gut suture.  The right tubal segment was sharply excised.  The excision site was made hemostatic with Bovie cautery if needed.  The uterine incision was  reinspected with hemostasis noted.  The tubal sites were reinspected and noted to be intact and hemostatic.  The retractor was removed.  The parietal peritoneum was then closed with a running suture of 2-0 Vicryl Rapide.  The subfascial spaces and rectus muscles were inspected with hemostasis noted.  The fascia was then closed with a running suture of 0 Vicryl.  The subcutaneous tissues were irrigated and made hemostatic with Bovie cautery.  The subcutaneous tissues were reapproximated with interrupted sutures of 2-0 plain gut.  The skin was closed with a subcuticular stitch of 3-0 Vicryl Rapide and reinforced with Steri-Strips.  A sterile dressing was then applied.    After removal of the drapes, a large amount of blood was noted on the mattress pad.  Vaginal exam revealed no clots in the vagina, cervix, or lower uterine segment.  The patient's bleeding was observed for over 5 minutes, and was minimal.  After expressing the uterus the patient was transitioned to the stretcher and taken to the recovery room in stable condition.  She tolerated the procedure well without complications.  All sponge, needle, and instrument counts were correct ×3 per the OR staff.    Moises Dacosta MD   12/5/2024  11:52 CST

## 2024-12-05 NOTE — PLAN OF CARE
Goal Outcome Evaluation:  Plan of Care Reviewed With: patient        Progress: improving          Vss. Output adequate. UU FF ML Scant. Tolerating scheduled meds well. Dressing C/D/I, no drainage.

## 2024-12-05 NOTE — LACTATION NOTE
Mother's Name: Sonia Phone #:[phone]  Infant Name: Undecided :2024  Gestation:36w0d  Day of life:0  Birth weight:  7-7 (3374g) LGA Discharge weight:  Weight Loss:   24 hour Summary of Feeds:  Voids:  Stools:  Assistive devices (shields, shells, etc):na  Significant Maternal history:, AMA, GDMA 1- diet controlled, polyhydramnios  Maternal Concerns:  denies  Maternal Goal: breastfeeding, ok with formula supplement if needed.   Mother's Medications: ASA, Zyrtec, Iron, Prilosec, PNV, Zoloft  Breastpump for home: Spectra  Ped follow up appt:    Call from NICU RN requesting lactation to consult with mother reguarding feeding plan, reports infant with glucose of 26 currently, receiving glucose gel but needs feeding as well. To recovery room, mother and fob decline DBM and ok with formula as needed. Reported back to NICU to proceed with formula supplement. With mother's permission, assisted to hand express. Collected 2 ml, transported to nicu and left at infant bedside- updated YFN Caceres.     Instructed patient our lactation team is here for continued support throughout their breastfeeding journey. Our team has encouraged patient to call with any questions or concerns that may arise after discharge.     1145  Called to assist with first feeding, infant glucose stable, respirations stable, mother STS with infant in NICU and infant cueing. Infant eager, latched easily and nursed eagerly for 15/25. Provided the 2 ml EBM previously collected. Initial breastfeeding education given and reviewed. Will set pump up in mothers room to use as needed for poor feedings/supplementing. Educated parents on risks of poor feedings due to GA and GDM. Encouraged hand expression in addition to feedings to provide max ebm to infant for better glucose control. Parents voice understanding. Questions currently denied.     Breastfeeding and Diaper Chart  Check List for Essentials of Positioning And Latch-on handout provided by  Lactation Education Resources  Hand Expression handout provided by Lactation Education Resources  Five Keys to Successful Breastfeeding handout provided by Lactation Education Resources    The Many Benefits if Breastfeeding handout given  Breastfeeding saves time  *Breastfeeding allows you to calm or feed your baby immediately, which leads to a happier baby who cries less  *There is nothing to buy, prepare, or maintain.There is nothing to clean or sterilize.  Breastfeeding builds a mothers confidence  *She knows all her baby needs to thrive is her!  Breastfeeding saves Money  *There is no formula to buy and healthier breast fed babies have less medical costs  Healthy Mom/Healthy baby  * babies get sick less often, and when they do they are usually sick less severely and for a shorter time  * babies have fewer ear infections  * babies have fewer allergies  *Mothers who breastfeed have a lower risk for cancer, osteoporosis, anemia, high blood pressure, obesity, and Type ll diabetes  *Mothers miss less work days with sick babies  Breast fed babies have a better dental health  * babies have better jaw development which requires lest orthodontic work  *Breast milk does not promote cavities  * babies can nurse at night without worry of tooth decay  Breastfeeding allows a baby to reach his full IQ potential  *The longer a baby is breast fed, the better their brain development  Breast fed babies and moms are more relaxed  *The hormones released during breastfeeding have a calming effect on mothers  *Breastfeeding requires mom to take a break; this may help mom get more rest after delivery  *Breastfeeding is quicker than preparing formula which allows mom and baby to get back to sleep faster  *Breastfeeding promotes bonding and allows mom to learn babies cues and care needs more quickly  Breastfeeding cleanup is easier  *The bowel movements and spit up of breast fed babies doesn't  smell as bad  *Spit-up of breast fed babies doesn't stain clothing  Getting out of the hourse is easier  *No formula bottles to prepare and carry safely   *No time restraints due to worry about what baby will eat  *No worries about warming a bottle or finding safe water to prepare bottles  Breastfeeding mother get their bodies back sooner  *The uterus shrinks more quickly and completely, which allows a flatter tummy  *Breastfeeding burns 400-500 calories a day; making milk torches stored fat!  Breastfeeding is better for the environment  *There is no trash to dispose of after breastfeeding  *There is no production facility to produce breast milk; moms body does it all without the pollution of a factory      Your Guide to Breastfeeding Booklet by [employer], [email or web address]      Safe Storage of Breastmilk magnet: [employer]

## 2024-12-05 NOTE — ANESTHESIA PROCEDURE NOTES
Spinal Block      Patient reassessed immediately prior to procedure    Patient location during procedure: OB  Indication:at surgeon's request and procedure for pain  Performed By  CRNA/CAA: Latonya Garcia CRNA  Preanesthetic Checklist  Completed: patient identified, IV checked, site marked, risks and benefits discussed, surgical consent, monitors and equipment checked, pre-op evaluation and timeout performed  Spinal Block Prep:  Patient Position:sitting  Sterile Tech:cap, gloves, mask and sterile barriers  Prep:Chloraprep  Patient Monitoring:blood pressure monitoring, continuous pulse oximetry and EKG    Spinal Block Procedure  Approach:midline  Guidance:landmark technique  Location:L4-L5  Needle Type:Pencan  Needle Gauge:25 G  Placement of Spinal needle event:cerebrospinal fluid aspirated  Paresthesia: no  Fluid Appearance:clear     Post Assessment  Patient Tolerance:patient tolerated the procedure well with no apparent complications  Complications no

## 2024-12-05 NOTE — PLAN OF CARE
Goal Outcome Evaluation:  Plan of Care Reviewed With: patient, spouse        Progress: improving  Outcome Evaluation: VSS; FF/ML/Uu with scant lochia, output adequate, incision with pressure dressing dry and intact, binder on, ERAS for pain, vomiting x1, patient bonding well with infant

## 2024-12-06 LAB
ABO GROUP BLD: NORMAL
ANTI-D: NORMAL
BASOPHILS # BLD AUTO: 0.09 10*3/MM3 (ref 0–0.2)
BASOPHILS NFR BLD AUTO: 0.6 % (ref 0–1.5)
BLD GP AB SCN SERPL QL: POSITIVE
DEPRECATED RDW RBC AUTO: 42.9 FL (ref 37–54)
EOSINOPHIL # BLD AUTO: 0.14 10*3/MM3 (ref 0–0.4)
EOSINOPHIL NFR BLD AUTO: 0.9 % (ref 0.3–6.2)
ERYTHROCYTE [DISTWIDTH] IN BLOOD BY AUTOMATED COUNT: 14.7 % (ref 12.3–15.4)
FETAL BLEED: NEGATIVE
HCT VFR BLD AUTO: 26.3 % (ref 34–46.6)
HGB BLD-MCNC: 7.9 G/DL (ref 12–15.9)
IMM GRANULOCYTES # BLD AUTO: 0.23 10*3/MM3 (ref 0–0.05)
IMM GRANULOCYTES NFR BLD AUTO: 1.5 % (ref 0–0.5)
LYMPHOCYTES # BLD AUTO: 1.3 10*3/MM3 (ref 0.7–3.1)
LYMPHOCYTES NFR BLD AUTO: 8.4 % (ref 19.6–45.3)
MCH RBC QN AUTO: 24.2 PG (ref 26.6–33)
MCHC RBC AUTO-ENTMCNC: 30 G/DL (ref 31.5–35.7)
MCV RBC AUTO: 80.4 FL (ref 79–97)
MONOCYTES # BLD AUTO: 1.11 10*3/MM3 (ref 0.1–0.9)
MONOCYTES NFR BLD AUTO: 7.2 % (ref 5–12)
NEUTROPHILS NFR BLD AUTO: 12.58 10*3/MM3 (ref 1.7–7)
NEUTROPHILS NFR BLD AUTO: 81.4 % (ref 42.7–76)
NRBC BLD AUTO-RTO: 0 /100 WBC (ref 0–0.2)
NUMBER OF DOSES: NORMAL
PLATELET # BLD AUTO: 285 10*3/MM3 (ref 140–450)
PMV BLD AUTO: 9.3 FL (ref 6–12)
RBC # BLD AUTO: 3.27 10*6/MM3 (ref 3.77–5.28)
RH BLD: NEGATIVE
WBC NRBC COR # BLD AUTO: 15.45 10*3/MM3 (ref 3.4–10.8)

## 2024-12-06 PROCEDURE — 25010000002 KETOROLAC TROMETHAMINE PER 15 MG: Performed by: OBSTETRICS & GYNECOLOGY

## 2024-12-06 PROCEDURE — 86870 RBC ANTIBODY IDENTIFICATION: CPT | Performed by: OBSTETRICS & GYNECOLOGY

## 2024-12-06 PROCEDURE — 86900 BLOOD TYPING SEROLOGIC ABO: CPT | Performed by: OBSTETRICS & GYNECOLOGY

## 2024-12-06 PROCEDURE — 85025 COMPLETE CBC W/AUTO DIFF WBC: CPT | Performed by: OBSTETRICS & GYNECOLOGY

## 2024-12-06 PROCEDURE — 85461 HEMOGLOBIN FETAL: CPT | Performed by: OBSTETRICS & GYNECOLOGY

## 2024-12-06 PROCEDURE — 86901 BLOOD TYPING SEROLOGIC RH(D): CPT | Performed by: OBSTETRICS & GYNECOLOGY

## 2024-12-06 PROCEDURE — 0503F POSTPARTUM CARE VISIT: CPT | Performed by: OBSTETRICS & GYNECOLOGY

## 2024-12-06 RX ADMIN — ACETAMINOPHEN 1000 MG: 500 TABLET, FILM COATED ORAL at 02:21

## 2024-12-06 RX ADMIN — KETOROLAC TROMETHAMINE 15 MG: 15 INJECTION, SOLUTION INTRAMUSCULAR; INTRAVENOUS at 04:36

## 2024-12-06 RX ADMIN — ACETAMINOPHEN 650 MG: 325 TABLET ORAL at 19:39

## 2024-12-06 RX ADMIN — SERTRALINE HYDROCHLORIDE 50 MG: 50 TABLET ORAL at 10:33

## 2024-12-06 RX ADMIN — DOCUSATE SODIUM 100 MG: 100 CAPSULE, LIQUID FILLED ORAL at 07:52

## 2024-12-06 RX ADMIN — PRENATAL VIT W/ FE FUMARATE-FA TAB 27-0.8 MG 1 TABLET: 27-0.8 TAB at 13:25

## 2024-12-06 RX ADMIN — KETOROLAC TROMETHAMINE 15 MG: 15 INJECTION, SOLUTION INTRAMUSCULAR; INTRAVENOUS at 10:33

## 2024-12-06 RX ADMIN — DOCUSATE SODIUM 100 MG: 100 CAPSULE, LIQUID FILLED ORAL at 22:31

## 2024-12-06 RX ADMIN — IBUPROFEN 600 MG: 600 TABLET, FILM COATED ORAL at 22:31

## 2024-12-06 RX ADMIN — IBUPROFEN 600 MG: 600 TABLET, FILM COATED ORAL at 17:09

## 2024-12-06 RX ADMIN — ACETAMINOPHEN 650 MG: 325 TABLET ORAL at 13:25

## 2024-12-06 RX ADMIN — ACETAMINOPHEN 1000 MG: 500 TABLET, FILM COATED ORAL at 07:51

## 2024-12-06 RX ADMIN — CETIRIZINE HYDROCHLORIDE TABLETS 10 MG: 10 TABLET, FILM COATED ORAL at 10:33

## 2024-12-06 RX ADMIN — OXYCODONE HYDROCHLORIDE 5 MG: 5 TABLET ORAL at 22:36

## 2024-12-06 NOTE — PLAN OF CARE
Goal Outcome Evaluation:              Outcome Evaluation: VSS, fundus and lochia wnl, de la paz removed, voiding, IID, ERAS control pain, ambulating, breastfeeding and pumping, visit infant in NICU, rested well with family at bedside

## 2024-12-06 NOTE — LACTATION NOTE
This note was copied from a baby's chart.  Name:   Day: 1  Dx: Hypoglycemia  Birth Gestation: 36w0d  Adjusted Gestation: 36w1d  Birth weight: 7-7 (3374g)  Last weight: 7-3.3 (3270g)             % of weight loss: -3.07%    Feeding Orders: 10 ml every 3 hours  Maternal Hx: , AMA, GDMA 1-diet controlled, Polyhydramnios  Prenatal Medications: PNV, Zoloft, Prilosec, Iron, Zyrtec, ASA  Pump available: Spectra  Pumping history in the last 24 hours:     Infant transferred to NICU due to hypoglycemia. NICU education provided (see below). Discussed expected amounts collected with pumping at this time, pumping every 3 hours and/or after breastfeeding, collecting colostrum in sterile containers, and steam sterilizing of pump parts. Sterile bottles and syringes given. Lactation support offered. Questions denied.     **Breast pump Kit Care Cleaning-Drying-Storing handout by [employer]   **Collecting,Labeling,Storing and Transporting Breast milk for Babies in the NICU handout  **Hand Expression handout provided by Lactation Education Resources   **Breast Engorgement Handout provided by Lactation Education Resources   **Check list for Essentials of Positioning and Latch-on  Handout provided by Lactation Education Resources  **The Many Benefits of Breastfeeding handout  **Hands on Pumping Handout provided by Lactation Education Resources  **Increasing breast milk supply for a Baby in the NICU handout provided by Lactation Education Resources  **My Breast pumping Log Handout  **When Will my Milk Come In/ handout provided  By the first latch  **Human Milk Storage for  Infants handout  **Care Plan for Breastfeeding your  Baby handout  **Using a Nipple Shield handout provided by Lactation Education Resources  **Providing Milk for Your NICU Baby Handout  **Quick Clean Micro Steam bags by [employer]  ** Freshly Expressed Breastmilk Storage Guidelines for Healthy Term Babies Magnet by [employer]    Providing Milk for your  NICU Baby  The following is a list of what to expect after the birth of your baby and how a mother's milk can make all the difference.    THE BENEFITS OF MOTHER'S OWN MILK FOR A NICU BABY  * In addition to all the benefits breast milk provides all babies (see list on other handout), NICU babies receive extra benefits.  * Your milk is easier on baby's tummy which may be less ready to digest food.  *  milk has more beneficial fat for growth, protein, and essential minerals.  * Mother's milk allows for better eye development and function  *Babies born early must finish their body tissue development after birth. The special protein in mother's milk allows for optimal body development  *Mother's milk is a natural antibiotic that protects baby from infections; babies fed breast milk are much less likely to develop gastrointestinal illnesses, including the dangerous infection necrotizing enterocolitis (NEC)  *Digesting breast milk is much easier for the baby. He is them able to use the julia calories he takes in for growth and development. It provides a fast fuel that is easily absorbed for use in critical body functions.    By providing human milk for your baby, you are providing a powerful medication that no hospital can make!!    HOW TO BEGIN:  *If you feel up to it, our lactation staff will help you begin pumping your milk as soon as possible after delivery. Our goal is to begin within 4 hours of baby's birth. If this is not possible, we must make every effort to institute pumping within 12 hours of delivery.    *Do not be discouraged by small amounts! This is super-concentrated nutrition and all baby needs is in those magical drops. In your folder there is a guide regarding target amounts and a pumping log to record your efforts at providing milk for your baby.

## 2024-12-06 NOTE — ANESTHESIA POSTPROCEDURE EVALUATION
Patient: Sonia Armstrong    Procedure Summary       Date: 24 Room / Location: [facility] LABOR DELIVERY  [facility] LABOR DELIVERY    Anesthesia Start: 743 Anesthesia Stop: 900    Procedure: Primary  section with bilateral tubal ligation (Abdomen) Diagnosis:       Placenta previa, third trimester      (Placenta previa, third trimester [O44.03])    Surgeons: Moises Dacosta MD Provider: Latonya Garcia CRNA    Anesthesia Type: spinal ASA Status: 2            Anesthesia Type: spinal    Vitals  Vitals Value Taken Time   /81 24 1233   Temp 98.4 °F (36.9 °C) 24 0741   Pulse 85 24 1233   Resp 18 24 1233   SpO2 99 % 24 1233           Post Anesthesia Care and Evaluation    Patient location during evaluation: floor  Patient participation: complete - patient participated  Level of consciousness: awake  Pain management: adequate    Airway patency: patent  Anesthetic complications: No anesthetic complications  PONV Status: none  Cardiovascular status: acceptable  Respiratory status: acceptable  Hydration status: acceptable  Post Neuraxial Block status: Motor and sensory function returned to baseline and No signs or symptoms of PDPH

## 2024-12-06 NOTE — PROGRESS NOTES
Remberto Conroy MD  [facility]  [address]  [place] [address]  Office [phone]  Fax [phone]      [facility]   PROGRESS NOTE    Post-Op Day 1 S/P   Subjective     Patient reports:  Pain is well controlled with  ERAS .  She is ambulating. Tolerating diet. Voiding - without difficulty; flatus reported..  Vaginal bleeding is as much as expected.      Objective      Vitals: Vital Signs Range for the last 24 hours  Temperature: Temp:  [97.7 °F (36.5 °C)-98.7 °F (37.1 °C)] 98.3 °F (36.8 °C)   Temp Source: Temp src: Temporal   BP: BP: ()/(49-86) 121/67   Pulse: Heart Rate:  [60-79] 63   Respirations: Resp:  [14-18] 18   SPO2: SpO2:  [97 %-100 %] 98 %   O2 Amount (l/min):     O2 Devices Device (Oxygen Therapy): room air   Weight:              Physical Exam    Lungs clear to auscultation bilaterally   Abdomen Soft, non-tender, normal bowel sounds; no bruits, organomegaly or masses.   Incision  healing well   Extremities extremities normal, atraumatic, no cyanosis or edema     I reviewed the patient's new clinical results.  Lab Results (last 24 hours)       Procedure Component Value Units Date/Time    CBC & Differential [[id number]]  (Abnormal) Collected: 24    Specimen: Blood Updated: 24    Narrative:      The following orders were created for panel order CBC & Differential.  Procedure                               Abnormality         Status                     ---------                               -----------         ------                     CBC Auto Differential[[id number]]        Abnormal            Final result                 Please view results for these tests on the individual orders.    CBC Auto Differential [[id number]]  (Abnormal) Collected: 24    Specimen: Blood Updated: 24     WBC 15.45 10*3/mm3      RBC 3.27 10*6/mm3      Hemoglobin 7.9 g/dL      Hematocrit 26.3 %      MCV 80.4 fL      MCH 24.2 pg      MCHC 30.0  g/dL      RDW 14.7 %      RDW-SD 42.9 fl      MPV 9.3 fL      Platelets 285 10*3/mm3      Neutrophil % 81.4 %      Lymphocyte % 8.4 %      Monocyte % 7.2 %      Eosinophil % 0.9 %      Basophil % 0.6 %      Immature Grans % 1.5 %      Neutrophils, Absolute 12.58 10*3/mm3      Lymphocytes, Absolute 1.30 10*3/mm3      Monocytes, Absolute 1.11 10*3/mm3      Eosinophils, Absolute 0.14 10*3/mm3      Basophils, Absolute 0.09 10*3/mm3      Immature Grans, Absolute 0.23 10*3/mm3      nRBC 0.0 /100 WBC     Treponema pallidum AB w/Reflex RPR [[id number]]  (Normal) Collected: 12/05/24 0604    Specimen: Blood Updated: 12/05/24 1303     Treponemal AB Total Non-Reactive    Narrative:      Reactive results will reflex RPR testing.            External Prenatal Results       Pregnancy Outside Results - Transcribed From Office Records - See Scanned Records For Details       Test Value Date Time    ABO  A  12/05/24 0604    Rh  Negative  12/05/24 0604    Antibody Screen  Positive  12/05/24 0604       Positive  11/12/24 0810       Positive  11/11/24 0145       Positive  10/15/24 0941       See Final Results  10/15/24 0941       Negative  04/30/24 0958    Varicella IgG       Rubella  6.02 index 05/10/24 0947    Hgb  7.9 g/dL 12/06/24 0638       9.4 g/dL 12/05/24 0604       9.3 g/dL 11/11/24 0145       9.3 g/dL 10/15/24 0941       11.6 g/dL 05/10/24 0947    Hct  26.3 % 12/06/24 0638       29.5 % 12/05/24 0604       29.6 % 11/11/24 0145       35.9 % 05/10/24 0947    HgB A1c        1h GTT  157 mg/dL 10/15/24 0941    3h GTT Fasting  82 mg/dL 10/29/24 0730    3h GTT 1 hour  186 mg/dL 10/29/24 0730    3h GTT 2 hour  178 mg/dL 10/29/24 0730    3h GTT 3 hour   97 mg/dL 10/29/24 0730    Gonorrhea (discrete)  Negative  05/10/24 0947    Chlamydia (discrete)  Negative  05/10/24 0947    RPR  Non Reactive  10/15/24 0941       Non Reactive  05/10/24 0947    Syphils cascade: TP-Ab (FTA)  Non-Reactive  12/05/24 0604       Non-Reactive  11/11/24 0145     TP-Ab  Non-Reactive  24 0604       Non-Reactive  24 0145    TP-Ab (EIA)       TPPA       HBsAg  Negative  05/10/24 0947    Herpes Simplex Virus PCR       Herpes Simplex VIrus Culture       HIV  Non Reactive  05/10/24 0947    Hep C RNA Quant PCR       Hep C Antibody  Non Reactive  05/10/24 0947    AFP       NIPT       Cystic Fibrosis (Nell)       Cystic Fibroisis        Spinal Muscular atrophy       Fragile X       Group B Strep       GBS Susceptibility to Clindamycin       GBS Susceptibility to Erythromycin       Fetal Fibronectin       Genetic Testing, Maternal Blood                 Drug Screening       Test Value Date Time    Urine Drug Screen       Amphetamine Screen       Barbiturate Screen       Benzodiazepine Screen       Methadone Screen       Phencyclidine Screen       Opiates Screen       THC Screen       Cocaine Screen       Propoxyphene Screen       Buprenorphine Screen       Methamphetamine Screen       Oxycodone Screen       Tricyclic Antidepressants Screen                 Legend    ^: Historical                            Assessment & Plan        Placenta previa, third trimester      Assessment:    Sonia Armstrong is Day 1  post-partum  , Low Transverse  .       Plan:  continue post op care.        Remberto Conroy MD  2024  07:06 CST

## 2024-12-07 PROCEDURE — 0503F POSTPARTUM CARE VISIT: CPT | Performed by: OBSTETRICS & GYNECOLOGY

## 2024-12-07 PROCEDURE — 25010000002 RHO D IMMUNE GLOBULIN 1500 UNIT/2ML SOLUTION PREFILLED SYRINGE: Performed by: OBSTETRICS & GYNECOLOGY

## 2024-12-07 RX ADMIN — ACETAMINOPHEN 650 MG: 325 TABLET ORAL at 07:55

## 2024-12-07 RX ADMIN — ACETAMINOPHEN 650 MG: 325 TABLET ORAL at 01:55

## 2024-12-07 RX ADMIN — OXYCODONE HYDROCHLORIDE 5 MG: 5 TABLET ORAL at 20:36

## 2024-12-07 RX ADMIN — CETIRIZINE HYDROCHLORIDE TABLETS 10 MG: 10 TABLET, FILM COATED ORAL at 09:06

## 2024-12-07 RX ADMIN — PRENATAL VIT W/ FE FUMARATE-FA TAB 27-0.8 MG 1 TABLET: 27-0.8 TAB at 13:47

## 2024-12-07 RX ADMIN — DOCUSATE SODIUM 100 MG: 100 CAPSULE, LIQUID FILLED ORAL at 20:36

## 2024-12-07 RX ADMIN — HUMAN RHO(D) IMMUNE GLOBULIN 1500 UNITS: 1500 SOLUTION INTRAMUSCULAR; INTRAVENOUS at 03:19

## 2024-12-07 RX ADMIN — SERTRALINE HYDROCHLORIDE 50 MG: 50 TABLET ORAL at 09:06

## 2024-12-07 RX ADMIN — IBUPROFEN 600 MG: 600 TABLET, FILM COATED ORAL at 23:23

## 2024-12-07 RX ADMIN — ACETAMINOPHEN 650 MG: 325 TABLET ORAL at 20:36

## 2024-12-07 RX ADMIN — IBUPROFEN 600 MG: 600 TABLET, FILM COATED ORAL at 11:05

## 2024-12-07 RX ADMIN — IBUPROFEN 600 MG: 600 TABLET, FILM COATED ORAL at 16:55

## 2024-12-07 RX ADMIN — ACETAMINOPHEN 650 MG: 325 TABLET ORAL at 13:47

## 2024-12-07 NOTE — PLAN OF CARE
Goal Outcome Evaluation:  Plan of Care Reviewed With: patient, spouse        Progress: improving  Outcome Evaluation: VSS. UU, FF,Scant. Voiding and ambulating. ERAS for pain. Pressure dx removed, Steri strips CDI. Pumping. Visists infant frequently in NICU.

## 2024-12-07 NOTE — PLAN OF CARE
Goal Outcome Evaluation:              Outcome Evaluation: VSS. FF, midline, UU, scant lochia. ERAS controlled pain all day. Up ad александр, ambulating to visit baby in NICU- infant now rooming in.

## 2024-12-07 NOTE — PLAN OF CARE
Goal Outcome Evaluation:              Outcome Evaluation: VSS. FFMLUU scant lochia. ERAS and Elif 5 for pain control. incision c/d/i. visits infant in nicu

## 2024-12-07 NOTE — PROGRESS NOTES
Moises Dacosta MD  [facility]  [address]  [place] [address]  Office [phone]  Fax [phone]      [facility]   PROGRESS NOTE    Post-Op Day 2 S/P   Subjective     Patient reports:  Pain is well controlled with ERAS.  She is ambulating. Tolerating diet. Voiding - without difficulty; flatus reported..  Vaginal bleeding is as much as expected.      Objective      Vitals: Vital Signs Range for the last 24 hours  Temperature: Temp:  [98 °F (36.7 °C)-98.6 °F (37 °C)] 98.6 °F (37 °C)   Temp Source: Temp src: Oral   BP: BP: (117-129)/(71-81) 117/74   Pulse: Heart Rate:  [72-85] 72   Respirations: Resp:  [16-18] 16   SPO2: SpO2:  [99 %-100 %] 100 %   O2 Amount (l/min):     O2 Devices Device (Oxygen Therapy): room air   Weight:              Physical Exam    Lungs clear to auscultation bilaterally   Abdomen Soft, appropriately tender   Incision  healing well, no erythema, no swelling, well approximated   Extremities edema 1+ and Homans sign is negative, no sign of DVT     None  Lab Results (last 24 hours)       ** No results found for the last 24 hours. **            External Prenatal Results       Pregnancy Outside Results - Transcribed From Office Records - See Scanned Records For Details       Test Value Date Time    ABO  A  24    Rh  Negative  24    Antibody Screen  Positive  24 0638       Positive  24 0604       Positive  24 0810       Positive  24 0145       Positive  10/15/24 0941       See Final Results  10/15/24 0941       Negative  24 0958    Varicella IgG       Rubella  6.02 index 05/10/24 0947    Hgb  7.9 g/dL 24 0638       9.4 g/dL 24 0604       9.3 g/dL 24 0145       9.3 g/dL 10/15/24 0941       11.6 g/dL 05/10/24 0947    Hct  26.3 % 24 0638       29.5 % 24 06       29.6 % 24 0145       35.9 % 05/10/24 0947    HgB A1c        1h GTT  157 mg/dL 10/15/24 0941    3h GTT Fasting   82 mg/dL 10/29/24 0730    3h GTT 1 hour  186 mg/dL 10/29/24 0730    3h GTT 2 hour  178 mg/dL 10/29/24 0730    3h GTT 3 hour   97 mg/dL 10/29/24 0730    Gonorrhea (discrete)  Negative  05/10/24 0947    Chlamydia (discrete)  Negative  05/10/24 0947    RPR  Non Reactive  10/15/24 0941       Non Reactive  05/10/24 0947    Syphils cascade: TP-Ab (FTA)  Non-Reactive  24 0604       Non-Reactive  24 0145    TP-Ab  Non-Reactive  24 0604       Non-Reactive  24 0145    TP-Ab (EIA)       TPPA       HBsAg  Negative  05/10/24 0947    Herpes Simplex Virus PCR       Herpes Simplex VIrus Culture       HIV  Non Reactive  05/10/24 0947    Hep C RNA Quant PCR       Hep C Antibody  Non Reactive  05/10/24 0947    AFP       NIPT       Cystic Fibrosis (Nell)       Cystic Fibroisis        Spinal Muscular atrophy       Fragile X       Group B Strep       GBS Susceptibility to Clindamycin       GBS Susceptibility to Erythromycin       Fetal Fibronectin       Genetic Testing, Maternal Blood                 Drug Screening       Test Value Date Time    Urine Drug Screen       Amphetamine Screen       Barbiturate Screen       Benzodiazepine Screen       Methadone Screen       Phencyclidine Screen       Opiates Screen       THC Screen       Cocaine Screen       Propoxyphene Screen       Buprenorphine Screen       Methamphetamine Screen       Oxycodone Screen       Tricyclic Antidepressants Screen                 Legend    ^: Historical                            Assessment & Plan        Placenta previa, third trimester      Assessment:    Sonia Armstrong is Day 2  post-partum  , Low Transverse  .       Plan:  continue post op care and infant still in NICU .        Moises Dacosta MD  2024  09:57 CST

## 2024-12-08 VITALS
TEMPERATURE: 97.6 F | OXYGEN SATURATION: 98 % | BODY MASS INDEX: 33.02 KG/M2 | DIASTOLIC BLOOD PRESSURE: 80 MMHG | WEIGHT: 210.4 LBS | HEIGHT: 67 IN | RESPIRATION RATE: 16 BRPM | SYSTOLIC BLOOD PRESSURE: 122 MMHG | HEART RATE: 80 BPM

## 2024-12-08 PROCEDURE — 0503F POSTPARTUM CARE VISIT: CPT | Performed by: OBSTETRICS & GYNECOLOGY

## 2024-12-08 RX ORDER — OXYCODONE HYDROCHLORIDE 5 MG/1
5 TABLET ORAL EVERY 8 HOURS PRN
Qty: 8 TABLET | Refills: 0 | Status: SHIPPED | OUTPATIENT
Start: 2024-12-08 | End: 2024-12-10

## 2024-12-08 RX ORDER — HYDROCORTISONE 25 MG/G
1 CREAM TOPICAL AS NEEDED
Status: DISCONTINUED | OUTPATIENT
Start: 2024-12-08 | End: 2024-12-08 | Stop reason: HOSPADM

## 2024-12-08 RX ADMIN — CETIRIZINE HYDROCHLORIDE TABLETS 10 MG: 10 TABLET, FILM COATED ORAL at 07:45

## 2024-12-08 RX ADMIN — ACETAMINOPHEN 650 MG: 325 TABLET ORAL at 02:10

## 2024-12-08 RX ADMIN — PRENATAL VIT W/ FE FUMARATE-FA TAB 27-0.8 MG 1 TABLET: 27-0.8 TAB at 14:01

## 2024-12-08 RX ADMIN — DOCUSATE SODIUM 100 MG: 100 CAPSULE, LIQUID FILLED ORAL at 07:45

## 2024-12-08 RX ADMIN — IBUPROFEN 600 MG: 600 TABLET, FILM COATED ORAL at 05:18

## 2024-12-08 RX ADMIN — ACETAMINOPHEN 650 MG: 325 TABLET ORAL at 07:45

## 2024-12-08 RX ADMIN — HYDROCORTISONE 1 APPLICATION: 25 CREAM TOPICAL at 02:12

## 2024-12-08 RX ADMIN — ACETAMINOPHEN 650 MG: 325 TABLET ORAL at 14:01

## 2024-12-08 RX ADMIN — SERTRALINE HYDROCHLORIDE 50 MG: 50 TABLET ORAL at 07:45

## 2024-12-08 RX ADMIN — IBUPROFEN 600 MG: 600 TABLET, FILM COATED ORAL at 10:07

## 2024-12-08 NOTE — DISCHARGE SUMMARY
Moises Dacosta MD  [facility]  [address]  [place] [id number]  Office [phone]  Fax [phone]    Discharge Summary    [facility]  Sonia Armstrong  : 1985  MRN: [id number]  CSN: [id number]    Date of Admission: 2024   Date of Discharge:  2024   Delivering Physician: Moises Dacosta MD       Admission Diagnosis: Placenta previa, third trimester [O44.03]  Placenta previa antepartum [O44.00]   Discharge Diagnosis: Pregnancy at 36w0d - delivered       Procedures: Primary  (LTCS)     Hospital Course  See the completed operative report for details regarding antepartum course and delivery.    Her postoperative course was unremarkable.  On POD # 3 she felt like she was ready for discharge.  She was evaluated by Dr. Dacosta who agreed she was able to be discharged to home.  She had no febrile morbidity. She had normal bowel and bladder function and was hemodynamically stable.  Her wound was healing well without obvious signs of infections.    Infant  female fetus weighing 3374 g (7 lb 7 oz)  Apgars -  7 @ 1 minute /  7 @ 5 minutes.    Discharge labs  Lab Results   Component Value Date    WBC 15.45 (H) 2024    HGB 7.9 (L) 2024    HCT 26.3 (L) 2024     2024       Discharge Medications     Discharge Medications        New Medications        Instructions Start Date   oxyCODONE 5 MG immediate release tablet  Commonly known as: ROXICODONE   5 mg, Oral, Every 8 Hours PRN             Continue These Medications        Instructions Start Date   ferrous sulfate 325 (65 FE) MG tablet   325 mg, Daily With Breakfast      glucose monitor monitoring kit   Pt to test QID, fasting and then 1h after each meal      Lancets misc   Pt to test QID, fasting and then 1h after each meal      omeprazole 10 MG capsule  Commonly known as: prilOSEC   10 mg, Daily      prenatal (CLASSIC) vitamin 28-0.8 MG tablet  tablet  Generic drug: prenatal vitamin   Daily      [employer] Aut[employer]ode Blood Glucose w/Device kit       sertraline 50 MG tablet  Commonly known as: ZOLOFT       ZyrTEC Allergy 10 MG tablet  Generic drug: cetirizine   1 tablet, Daily             Stop These Medications      aspirin 81 MG EC tablet     glucose blood test strip              External Prenatal Results       Pregnancy Outside Results - Transcribed From Office Records - See Scanned Records For Details       Test Value Date Time    ABO  A  12/06/24 0638    Rh  Negative  12/06/24 0638    Antibody Screen  Positive  12/06/24 0638       Positive  12/05/24 0604       Positive  11/12/24 0810       Positive  11/11/24 0145       Positive  10/15/24 0941       See Final Results  10/15/24 0941       Negative  04/30/24 0958    Varicella IgG       Rubella  6.02 index 05/10/24 0947    Hgb  7.9 g/dL 12/06/24 0638       9.4 g/dL 12/05/24 0604       9.3 g/dL 11/11/24 0145       9.3 g/dL 10/15/24 0941       11.6 g/dL 05/10/24 0947    Hct  26.3 % 12/06/24 0638       29.5 % 12/05/24 0604       29.6 % 11/11/24 0145       35.9 % 05/10/24 0947    HgB A1c        1h GTT  157 mg/dL 10/15/24 0941    3h GTT Fasting  82 mg/dL 10/29/24 0730    3h GTT 1 hour  186 mg/dL 10/29/24 0730    3h GTT 2 hour  178 mg/dL 10/29/24 0730    3h GTT 3 hour   97 mg/dL 10/29/24 0730    Gonorrhea (discrete)  Negative  05/10/24 0947    Chlamydia (discrete)  Negative  05/10/24 0947    RPR  Non Reactive  10/15/24 0941       Non Reactive  05/10/24 0947    Syphils cascade: TP-Ab (FTA)  Non-Reactive  12/05/24 0604       Non-Reactive  11/11/24 0145    TP-Ab  Non-Reactive  12/05/24 0604       Non-Reactive  11/11/24 0145    TP-Ab (EIA)       TPPA       HBsAg  Negative  05/10/24 0947    Herpes Simplex Virus PCR       Herpes Simplex VIrus Culture       HIV  Non Reactive  05/10/24 0947    Hep C RNA Quant PCR       Hep C Antibody  Non Reactive  05/10/24 0947    AFP       NIPT       Cystic Fibrosis (Nell)       Cystic  Fibroisis        Spinal Muscular atrophy       Fragile X       Group B Strep       GBS Susceptibility to Clindamycin       GBS Susceptibility to Erythromycin       Fetal Fibronectin       Genetic Testing, Maternal Blood                 Drug Screening       Test Value Date Time    Urine Drug Screen       Amphetamine Screen       Barbiturate Screen       Benzodiazepine Screen       Methadone Screen       Phencyclidine Screen       Opiates Screen       THC Screen       Cocaine Screen       Propoxyphene Screen       Buprenorphine Screen       Methamphetamine Screen       Oxycodone Screen       Tricyclic Antidepressants Screen                 Legend    ^: Historical                            Discharge Disposition Home or Self Care   Condition on Discharge: good   Follow-up: 2 weeks with Olesya Dacosta MD  12/8/2024

## 2024-12-08 NOTE — PROGRESS NOTES
Moises Dacosta MD  [facility]  [address]  [place] [address]  Office [phone]  Fax [phone]      [facility]   PROGRESS NOTE    Post-Op Day 3 S/P   Subjective     Patient reports:  Pain is well controlled with ERAS.  She is ambulating. Tolerating diet. Voiding - without difficulty; flatus reported..  Vaginal bleeding is as much as expected.      Objective      Vitals: Vital Signs Range for the last 24 hours  Temperature: Temp:  [97.6 °F (36.4 °C)] 97.6 °F (36.4 °C)   Temp Source: Temp src: Oral   BP: BP: (122-136)/(64-83) 122/80   Pulse: Heart Rate:  [68-98] 80   Respirations: Resp:  [16-18] 16   SPO2: SpO2:  [98 %-100 %] 98 %   O2 Amount (l/min):     O2 Devices Device (Oxygen Therapy): room air   Weight:              Physical Exam    Lungs clear to auscultation bilaterally   Abdomen Soft, approp tender   Incision  healing well, no erythema, no swelling, well approximated   Extremities edema 2+ and Homans sign is negative, no sign of DVT     None  Lab Results (last 24 hours)       ** No results found for the last 24 hours. **            External Prenatal Results       Pregnancy Outside Results - Transcribed From Office Records - See Scanned Records For Details       Test Value Date Time    ABO  A  24 06    Rh  Negative  24 0638    Antibody Screen  Positive  24 0638       Positive  24 0604       Positive  24 0810       Positive  24 0145       Positive  10/15/24 0941       See Final Results  10/15/24 0941       Negative  24 0958    Varicella IgG       Rubella  6.02 index 05/10/24 0947    Hgb  7.9 g/dL 24 0638       9.4 g/dL 24 0604       9.3 g/dL 24 0145       9.3 g/dL 10/15/24 0941       11.6 g/dL 05/10/24 0947    Hct  26.3 % 24 0638       29.5 % 24 0604       29.6 % 24 0145       35.9 % 05/10/24 0947    HgB A1c        1h GTT  157 mg/dL 10/15/24 0941    3h GTT Fasting  82 mg/dL 10/29/24  0730    3h GTT 1 hour  186 mg/dL 10/29/24 0730    3h GTT 2 hour  178 mg/dL 10/29/24 0730    3h GTT 3 hour   97 mg/dL 10/29/24 0730    Gonorrhea (discrete)  Negative  05/10/24 0947    Chlamydia (discrete)  Negative  05/10/24 0947    RPR  Non Reactive  10/15/24 0941       Non Reactive  05/10/24 0947    Syphils cascade: TP-Ab (FTA)  Non-Reactive  24 0604       Non-Reactive  24 0145    TP-Ab  Non-Reactive  24 0604       Non-Reactive  24 0145    TP-Ab (EIA)       TPPA       HBsAg  Negative  05/10/24 0947    Herpes Simplex Virus PCR       Herpes Simplex VIrus Culture       HIV  Non Reactive  05/10/24 0947    Hep C RNA Quant PCR       Hep C Antibody  Non Reactive  05/10/24 0947    AFP       NIPT       Cystic Fibrosis (Nell)       Cystic Fibroisis        Spinal Muscular atrophy       Fragile X       Group B Strep       GBS Susceptibility to Clindamycin       GBS Susceptibility to Erythromycin       Fetal Fibronectin       Genetic Testing, Maternal Blood                 Drug Screening       Test Value Date Time    Urine Drug Screen       Amphetamine Screen       Barbiturate Screen       Benzodiazepine Screen       Methadone Screen       Phencyclidine Screen       Opiates Screen       THC Screen       Cocaine Screen       Propoxyphene Screen       Buprenorphine Screen       Methamphetamine Screen       Oxycodone Screen       Tricyclic Antidepressants Screen                 Legend    ^: Historical                            Assessment & Plan        Placenta previa, third trimester      Assessment:    Sonia Armstrong is Day 3  post-partum  , Low Transverse  .       Plan:  plan for discharge today.        Moises Dacosta MD  2024  11:15 CST

## 2024-12-08 NOTE — PLAN OF CARE
Goal Outcome Evaluation:           Progress: improving  Outcome Evaluation: VSS; FFML U1 with scant bleeding; low transverse with steri strips clean dry and intact; pain well controlled with ERAS and PRN waldo; ambulating and voiding without difficulty; passing gas; breastfeeding going well; bonding well

## 2024-12-09 LAB
BH BB BLOOD EXPIRATION DATE: NORMAL
BH BB BLOOD EXPIRATION DATE: NORMAL
BH BB BLOOD TYPE BARCODE: 600
BH BB BLOOD TYPE BARCODE: 600
BH BB DISPENSE STATUS: NORMAL
BH BB DISPENSE STATUS: NORMAL
BH BB PRODUCT CODE: NORMAL
BH BB PRODUCT CODE: NORMAL
BH BB UNIT NUMBER: NORMAL
BH BB UNIT NUMBER: NORMAL
CROSSMATCH INTERPRETATION: NORMAL
CROSSMATCH INTERPRETATION: NORMAL
CYTO UR: NORMAL
LAB AP CASE REPORT: NORMAL
Lab: NORMAL
PATH REPORT.FINAL DX SPEC: NORMAL
PATH REPORT.GROSS SPEC: NORMAL
UNIT  ABO: NORMAL
UNIT  ABO: NORMAL
UNIT  RH: NORMAL
UNIT  RH: NORMAL

## 2024-12-10 ENCOUNTER — CARE COORDINATION (OUTPATIENT)
Dept: OTHER | Facility: CLINIC | Age: 39
End: 2024-12-10

## 2024-12-10 ENCOUNTER — TELEPHONE (OUTPATIENT)
Dept: LACTATION | Facility: HOSPITAL | Age: 39
End: 2024-12-10
Payer: COMMERCIAL

## 2024-12-10 NOTE — TELEPHONE ENCOUNTER
Called and spoke with Sonia, notified her she has apprx 5 mls of colostrum here that was left in breastmilk fridge. Sonia gives permission to discard milk. This RN asked for update on breastfeeding progress. Patient reports she has been attempting breastfeeding but d/t pediatrician recommending neosure supplementing, infant has quickly developed a preference for bottle and is a bit reluctant to latch and breastfeed. She reports she is attempting breastfeeding with each feeding every 3 hours, pumps for 10-20 mins (until milk slows) and collects between 1-1.5 oz per session. Infant is now 5 days old. Education over the phone included:  paced bottle feeding, detailed instructions for this provided but also encouraged to watch youtube videos.   Enticement techniques at the breast (pre-feeding infant with partial paced bottle feeding or syringe feeding at breast)   Encourage continued breastfeeding attempts although they may be unsuccessful or limited to a short 5-10 min attempt, this is still great practice for infant  Encouraged more aggressive pumping sessions to protect milk supply and encourage more robust supply to be produced while working towards direct breastfeeding. Recommend pumping for 5-10 mins PASSED end of milk flow. Encouraged more frequent pump sessions ( every 2 hours verses every 3).   Encourage lots of STS holding and feedings.   Reiterated expectations at this time for exclusive direct breastfeeding, this will take continued practice and time, but still very much achievable. Also provided reassurance current milk supply is continuing to build and her production/collection should continue to increase over the next few weeks.     Also encouraged Sonia to take advantage of our outpatient lactation services. She is very interested in this and requesting appt for this week at \[facility]\"" location, Earliest available is Friday, September 13 th at 12pm.  Mother agrees to this date and  time. Instructions for appt provided. Much encouragement offered.

## 2024-12-11 ENCOUNTER — CARE COORDINATION (OUTPATIENT)
Dept: OTHER | Facility: CLINIC | Age: 39
End: 2024-12-11

## 2024-12-12 DIAGNOSIS — D50.9 IRON DEFICIENCY ANEMIA, UNSPECIFIED IRON DEFICIENCY ANEMIA TYPE: Primary | ICD-10-CM

## 2024-12-12 RX ORDER — ALBUTEROL SULFATE 90 UG/1
4 INHALANT RESPIRATORY (INHALATION) PRN
OUTPATIENT
Start: 2024-12-12

## 2024-12-12 RX ORDER — SODIUM CHLORIDE 9 MG/ML
5-250 INJECTION, SOLUTION INTRAVENOUS PRN
Status: CANCELLED | OUTPATIENT
Start: 2024-12-12

## 2024-12-12 RX ORDER — EPINEPHRINE 1 MG/ML
0.3 INJECTION, SOLUTION, CONCENTRATE INTRAVENOUS PRN
OUTPATIENT
Start: 2024-12-12

## 2024-12-12 RX ORDER — HYDROCORTISONE SODIUM SUCCINATE 100 MG/2ML
100 INJECTION INTRAMUSCULAR; INTRAVENOUS
OUTPATIENT
Start: 2024-12-12

## 2024-12-12 RX ORDER — DIPHENHYDRAMINE HYDROCHLORIDE 50 MG/ML
50 INJECTION INTRAMUSCULAR; INTRAVENOUS
OUTPATIENT
Start: 2024-12-12

## 2024-12-12 RX ORDER — ONDANSETRON 2 MG/ML
8 INJECTION INTRAMUSCULAR; INTRAVENOUS
OUTPATIENT
Start: 2024-12-12

## 2024-12-12 RX ORDER — SODIUM CHLORIDE 0.9 % (FLUSH) 0.9 %
5-40 SYRINGE (ML) INJECTION PRN
Status: CANCELLED | OUTPATIENT
Start: 2024-12-12

## 2024-12-12 RX ORDER — ACETAMINOPHEN 325 MG/1
650 TABLET ORAL
OUTPATIENT
Start: 2024-12-12

## 2024-12-12 RX ORDER — SODIUM CHLORIDE 9 MG/ML
INJECTION, SOLUTION INTRAVENOUS CONTINUOUS
OUTPATIENT
Start: 2024-12-12

## 2024-12-13 ENCOUNTER — HOSPITAL ENCOUNTER (OUTPATIENT)
Dept: INFUSION THERAPY | Age: 39
Setting detail: INFUSION SERIES
Discharge: HOME | End: 2024-12-13
Payer: COMMERCIAL

## 2024-12-13 ENCOUNTER — HOSPITAL ENCOUNTER (OUTPATIENT)
Dept: LACTATION | Facility: HOSPITAL | Age: 39
Discharge: HOME | End: 2024-12-13
Payer: COMMERCIAL

## 2024-12-13 VITALS
RESPIRATION RATE: 18 BRPM | OXYGEN SATURATION: 99 % | TEMPERATURE: 98.6 F | SYSTOLIC BLOOD PRESSURE: 123 MMHG | DIASTOLIC BLOOD PRESSURE: 80 MMHG | HEART RATE: 61 BPM

## 2024-12-13 DIAGNOSIS — D50.9 IRON DEFICIENCY ANEMIA, UNSPECIFIED IRON DEFICIENCY ANEMIA TYPE: Primary | ICD-10-CM

## 2024-12-13 PROCEDURE — 6360000002 HC RX W HCPCS: Performed by: INTERNAL MEDICINE

## 2024-12-13 PROCEDURE — 96365 THER/PROPH/DIAG IV INF INIT: CPT

## 2024-12-13 PROCEDURE — 2580000003 HC RX 258: Performed by: INTERNAL MEDICINE

## 2024-12-13 RX ORDER — SODIUM CHLORIDE 9 MG/ML
INJECTION, SOLUTION INTRAVENOUS CONTINUOUS
OUTPATIENT
Start: 2024-12-20

## 2024-12-13 RX ORDER — HYDROCORTISONE SODIUM SUCCINATE 100 MG/2ML
100 INJECTION INTRAMUSCULAR; INTRAVENOUS
OUTPATIENT
Start: 2024-12-20

## 2024-12-13 RX ORDER — SODIUM CHLORIDE 0.9 % (FLUSH) 0.9 %
5-40 SYRINGE (ML) INJECTION PRN
Status: DISCONTINUED | OUTPATIENT
Start: 2024-12-13 | End: 2024-12-14 | Stop reason: HOSPADM

## 2024-12-13 RX ORDER — SODIUM CHLORIDE 9 MG/ML
5-250 INJECTION, SOLUTION INTRAVENOUS PRN
Status: CANCELLED | OUTPATIENT
Start: 2024-12-20

## 2024-12-13 RX ORDER — ONDANSETRON 2 MG/ML
8 INJECTION INTRAMUSCULAR; INTRAVENOUS
OUTPATIENT
Start: 2024-12-20

## 2024-12-13 RX ORDER — ALBUTEROL SULFATE 90 UG/1
4 INHALANT RESPIRATORY (INHALATION) PRN
OUTPATIENT
Start: 2024-12-20

## 2024-12-13 RX ORDER — SODIUM CHLORIDE 9 MG/ML
5-250 INJECTION, SOLUTION INTRAVENOUS PRN
Status: DISCONTINUED | OUTPATIENT
Start: 2024-12-13 | End: 2024-12-14 | Stop reason: HOSPADM

## 2024-12-13 RX ORDER — EPINEPHRINE 1 MG/ML
0.3 INJECTION, SOLUTION, CONCENTRATE INTRAVENOUS PRN
OUTPATIENT
Start: 2024-12-20

## 2024-12-13 RX ORDER — DIPHENHYDRAMINE HYDROCHLORIDE 50 MG/ML
50 INJECTION INTRAMUSCULAR; INTRAVENOUS
OUTPATIENT
Start: 2024-12-20

## 2024-12-13 RX ORDER — ACETAMINOPHEN 325 MG/1
650 TABLET ORAL
OUTPATIENT
Start: 2024-12-20

## 2024-12-13 RX ORDER — SODIUM CHLORIDE 0.9 % (FLUSH) 0.9 %
5-40 SYRINGE (ML) INJECTION PRN
Status: CANCELLED | OUTPATIENT
Start: 2024-12-20

## 2024-12-13 RX ADMIN — DEXTROSE MONOHYDRATE 510 MG: 5 INJECTION, SOLUTION INTRAVENOUS at 14:56

## 2024-12-13 NOTE — PROGRESS NOTES
PIV inserted a feraheme given as ordered. Patient's  is an MD and pt states he is able to monitor patient at home in case of reaction, so no post infusion monitoring completed. Pt tolerated infusion well.

## 2024-12-13 NOTE — LACTATION NOTE
Mother's Name:  Abeba Armstrong  G/P:    3/  Breastfeeding Hx:  Bf'ed 1 yr with now 9 yr old, and 4 months with now 13 year-old.   Significant Medical History:  Maternal Breast Assessment: Bilateral filling breasts. Milk easily expressed.    Infant's Name:  Kortney Armstrong  Date of Birth:   24  Gestational age at Birth:  Age:    8 days  Physician:   Marija Flores MD                     Reason for Visit:  [place] follow up, weight check, transfer eval          Infant's Birth weight:  7-7 3374g  Previous Weight:  7-3.3 3270g  Wt Loss:    Today's Weight:       Wt Loss:    Feeding History Since Discharge/Last Lactation Appt.:    Past 24 Hours Voids/Stools:        Color of Stool:    Pre Weight:           Left Breast:                    Right Breast:                         Total Minutes:             Total Weight Gain:          gms    Average Feeding Amount for Age:     Interventions:    Education:    Notified MD/ Orders Received:    Feeding Plan:     Plan of Care:    Interventions accomplished satisfactorily, requires no further action.    Interventions require further assessment with [facility] Lactation    Interventions require further assessment with MD    Future Appointments:    Lactation:    Physician:    Signature:    Faxed to:  Date:

## 2024-12-16 DIAGNOSIS — O44.03 PLACENTA PREVIA, THIRD TRIMESTER: Primary | ICD-10-CM

## 2024-12-16 RX ORDER — TRAMADOL HYDROCHLORIDE 50 MG/1
50 TABLET ORAL EVERY 8 HOURS PRN
Qty: 6 TABLET | Refills: 0 | Status: SHIPPED | OUTPATIENT
Start: 2024-12-16 | End: 2024-12-19

## 2024-12-17 ENCOUNTER — MATERNAL SCREENING (OUTPATIENT)
Dept: CALL CENTER | Facility: HOSPITAL | Age: 39
End: 2024-12-17
Payer: COMMERCIAL

## 2024-12-17 NOTE — OUTREACH NOTE
Maternal Screening Survey      Flowsheet Row Responses   Facility patient discharged from? [facility]   Attempt successful? Yes   Call start time 1133   Call end time 1136   I have been able to laugh and see the funny side of things. 0   I have looked forward with enjoyment to things. 0   I have blamed myself unnecessarily when things went wrong. 0   I have been anxious or worried for no good reason. 0   I have felt scared or panicky for no good reason. 0   Things have been getting on top of me. 0   I have been so unhappy that I have had difficulty sleeping. 0   I have felt sad or miserable. 0   I have been so unhappy that I have been crying. 0   The thought of harming myself has occurred to me. 0   [place]  Depression Scale Total 0   Did any of your parents have problems with alcohol or drug use? No   Do any of your peers have problems with alcohol or drug use? No   Does your partner have problems with alcohol or drug use? No   Before you were pregnant did you have problems with alcohol or drug use? (past) No   In the past month, did you drink beer, wine, liquor or use any other drugs? (pregnancy) No   Maternal Screening call completed Yes   Call end time 1136              Lidia RAMON - Registered Nurse

## 2024-12-19 ENCOUNTER — POSTPARTUM VISIT (OUTPATIENT)
Age: 39
End: 2024-12-19
Payer: COMMERCIAL

## 2024-12-19 VITALS
DIASTOLIC BLOOD PRESSURE: 94 MMHG | HEIGHT: 67 IN | BODY MASS INDEX: 28.72 KG/M2 | SYSTOLIC BLOOD PRESSURE: 144 MMHG | WEIGHT: 183 LBS

## 2024-12-19 DIAGNOSIS — Z09 POSTOP CHECK: Primary | ICD-10-CM

## 2024-12-19 PROBLEM — O44.03 PLACENTA PREVIA, THIRD TRIMESTER: Status: RESOLVED | Noted: 2024-11-14 | Resolved: 2024-12-19

## 2024-12-19 PROCEDURE — 99024 POSTOP FOLLOW-UP VISIT: CPT | Performed by: OBSTETRICS & GYNECOLOGY

## 2024-12-19 NOTE — PROGRESS NOTES
"Sonia Armstrong is a 39 y.o. female here today for incision check after undergoing  on .  She has been doing well since her discharge from the hospital and denies fevers, significant abdominal pain, nausea, or problems with her incision.  Her infant is breast and formula feeding well and she denies postpartum blues. She is getting iron infusions for anemia.    /94 (BP Location: Left arm, Patient Position: Sitting)   Ht 170.2 cm (67\")   Wt 83 kg (183 lb)   LMP 2024   Breastfeeding Yes   BMI 28.66 kg/m²   In general pleasant female no acute distress  Abdomen soft and nontender  Her surgical taping is removed and her incision is healing well without signs of infection    Assessment: Normal incision check after a     She will continue with light activities and pelvic rest.  She will followup in 4 weeks for a postpartum visit and call in the meantime if she has any questions or concerns.   "

## 2024-12-20 ENCOUNTER — HOSPITAL ENCOUNTER (OUTPATIENT)
Dept: INFUSION THERAPY | Age: 39
Setting detail: INFUSION SERIES
Discharge: HOME | End: 2024-12-20
Payer: COMMERCIAL

## 2024-12-20 VITALS
OXYGEN SATURATION: 99 % | DIASTOLIC BLOOD PRESSURE: 79 MMHG | HEART RATE: 85 BPM | SYSTOLIC BLOOD PRESSURE: 120 MMHG | RESPIRATION RATE: 18 BRPM

## 2024-12-20 DIAGNOSIS — D50.9 IRON DEFICIENCY ANEMIA, UNSPECIFIED IRON DEFICIENCY ANEMIA TYPE: Primary | ICD-10-CM

## 2024-12-20 PROCEDURE — 2580000003 HC RX 258: Performed by: INTERNAL MEDICINE

## 2024-12-20 PROCEDURE — 96365 THER/PROPH/DIAG IV INF INIT: CPT

## 2024-12-20 PROCEDURE — 6360000002 HC RX W HCPCS: Performed by: INTERNAL MEDICINE

## 2024-12-20 RX ORDER — ACETAMINOPHEN 325 MG/1
650 TABLET ORAL
OUTPATIENT
Start: 2024-12-20

## 2024-12-20 RX ORDER — SODIUM CHLORIDE 9 MG/ML
INJECTION, SOLUTION INTRAVENOUS CONTINUOUS
OUTPATIENT
Start: 2024-12-20

## 2024-12-20 RX ORDER — EPINEPHRINE 1 MG/ML
0.3 INJECTION, SOLUTION, CONCENTRATE INTRAVENOUS PRN
OUTPATIENT
Start: 2024-12-20

## 2024-12-20 RX ORDER — SODIUM CHLORIDE 9 MG/ML
5-250 INJECTION, SOLUTION INTRAVENOUS PRN
Status: CANCELLED | OUTPATIENT
Start: 2024-12-20

## 2024-12-20 RX ORDER — SODIUM CHLORIDE 0.9 % (FLUSH) 0.9 %
5-40 SYRINGE (ML) INJECTION PRN
Status: DISCONTINUED | OUTPATIENT
Start: 2024-12-20 | End: 2024-12-21 | Stop reason: HOSPADM

## 2024-12-20 RX ORDER — SODIUM CHLORIDE 0.9 % (FLUSH) 0.9 %
5-40 SYRINGE (ML) INJECTION PRN
Status: CANCELLED | OUTPATIENT
Start: 2024-12-20

## 2024-12-20 RX ORDER — DIPHENHYDRAMINE HYDROCHLORIDE 50 MG/ML
50 INJECTION INTRAMUSCULAR; INTRAVENOUS
OUTPATIENT
Start: 2024-12-20

## 2024-12-20 RX ORDER — HYDROCORTISONE SODIUM SUCCINATE 100 MG/2ML
100 INJECTION INTRAMUSCULAR; INTRAVENOUS
OUTPATIENT
Start: 2024-12-20

## 2024-12-20 RX ORDER — ALBUTEROL SULFATE 90 UG/1
4 INHALANT RESPIRATORY (INHALATION) PRN
OUTPATIENT
Start: 2024-12-20

## 2024-12-20 RX ORDER — SODIUM CHLORIDE 9 MG/ML
5-250 INJECTION, SOLUTION INTRAVENOUS PRN
Status: DISCONTINUED | OUTPATIENT
Start: 2024-12-20 | End: 2024-12-21 | Stop reason: HOSPADM

## 2024-12-20 RX ORDER — ONDANSETRON 2 MG/ML
8 INJECTION INTRAMUSCULAR; INTRAVENOUS
OUTPATIENT
Start: 2024-12-20

## 2024-12-20 RX ADMIN — FERUMOXYTOL 510 MG: 510 INJECTION INTRAVENOUS at 16:48

## 2024-12-20 NOTE — PROGRESS NOTES
Pt arrived to OPIT. PIV inserted and brisk blood return noted. Pt received 510mg Feraheme. Tolerated well.    Electronically signed by Nubia Giron RN on 12/20/2024 at 5:16 PM

## 2024-12-23 ENCOUNTER — HOSPITAL ENCOUNTER (OUTPATIENT)
Dept: LACTATION | Facility: HOSPITAL | Age: 39
Discharge: HOME | End: 2024-12-23
Payer: COMMERCIAL

## 2024-12-23 NOTE — LACTATION NOTE
Mother's Name:  Tona Armstrong  G/P:    3/3  Breastfeeding Hx:  Bf'ed one year and 4 months with now 11 and 9 yr old milk.   Significant Medical History:  Maternal Breast Assessment:  bilateral soft breast. Milk easily expressed. No trauma.    Infant's Name:  Kortney Armstrong  Date of Birth:   [birth year]  Gestational age at Birth: 36 wks  Age:    [birth year]24  Physician:   Marija Flores MD                     Reason for Visit:  Transfer eval, weight check          Infant's Birth weight:  7-7 3374g  Previous Weight:  7-3.3 3270g    Today's Weight:     7-9.1 3434g  Past birth wt at 3 weeks.    Feeding History Since Discharge/Last Lactation Appt.: Mom is bfing every feed. 1/4 scoop per 2 oz EBM is given per bottle, every feeding per MD orders. Sometimes full formula feeds are given as mother lacks enough EBM. Pt pumps after bfing; collecting 1 1/2 oz on R; scant 5-10 mls on L. Infant is then PACED bottle fed (either lying or sitting up).     Past 24 Hours Voids/Stools:    8 -10 / 6-8    Color of Stool: yellow watery      Time at Breast         Right Breast:  20 mins +28 mls  Right Breast:    10 mins +2 mls    Final Wt:  7-10.1                        Total Minutes:     30        Total Weight Gain:    30      gms    Average Feeding Amount for Age: 2-3 oz, 60-90 mls, each feeding; 8-12 times in 24 hours.     Interventions: Assisted with waking, breaking seal at breast, deep latching. Pre/post feed weights taken. Mom proficient with latching on own, but not assertive with keeping infant into breast.    Education: feeding options, average feed amounts    Notified MD/ Orders Received:    Feeding Plan:   OPTIONS:   Do more formula feeds and omit pumping to give yourself rest when needed instead of pumping.  Consider safe donor breast milk.  Try a different formula if you feel it might help gastro issues.  Consider more galactogogues:  wise's yeast cookies  Do more skin to skin time.  Consult Dr. Flores on how long to continue  giving formula additive to EBM bottles.     If bfing is done, allow her to be on R breast full 15-20 mins. Limit time at left to 5 -10 mins.  Continue giving some bf's and paced bottle feeding unless you decide FOR SURE  that NO bfing will be your plan.      Plan of Care:  Interventions accomplished satisfactorily, requires no further action.    Future Appointments:    Lactation: Call as desired:  [phone]    Physician: Dr. Flores at 2 month check up    Signature: SHERON CifuentesCLC    Faxed to: Dr. Floers with parents' verbal permission    Date:  12/23/24

## 2024-12-30 ENCOUNTER — TELEPHONE (OUTPATIENT)
Dept: LACTATION | Facility: HOSPITAL | Age: 39
End: 2024-12-30
Payer: COMMERCIAL

## 2024-12-30 ENCOUNTER — CARE COORDINATION (OUTPATIENT)
Dept: OTHER | Facility: CLINIC | Age: 39
End: 2024-12-30

## 2024-12-30 NOTE — CARE COORDINATION
Patient eligible for [facility] Maternity Management Program    Maternity Care Manager contacted the patient by telephone to discuss the maternity management program.  Patient agrees to care management services at this time. Verified name and  with patient as identifiers.   Patient Current Location: Home: [address] [place], [place] [id number]      Call within 2 business days of discharge: Yes     Risk Factors Identified:  primaryc/s , multip 3rd baby      Needs to be reviewed by the provider   none         Method of communication with provider : none    Advance Care Planning:   Does patient have an Advance Directive:  not on file.     Does patient have OB/Gyn Selected? Yes    Discussed follow up appointments. If no appointment was previously scheduled, appointment scheduling offered: Yes  [facility] follow up appointment(s): No future appointments.  Non-BS follow up appointment(s): 24    OB History:   OB History    Para Term  AB Living   2 2       2   SAB IAB Ectopic Molar Multiple Live Births                    # Outcome Date GA Lbr Michael/2nd Weight Sex Type Anes PTL Lv   2 Para            1 Para                Unknown    Medication reconciliation was performed with patient, who verbalizes understanding of administration of home medications. Advised obtaining a 90-day supply of all daily and as-needed medications.     Barriers/Support system:  spouse  Return to work planning? N/a      Postpartum Assessment: 4w pp    Lochia-normal/light  Incision-c/d/i  Fever No  BM? Yes   Decreased urinary output No  Headache No  Swelling No  Breast Pain No  Depression or feelings of sadness or overwhelm-none  Breast Feeding Yes  Infant's weight  Patient stated delivery experience: normal  Risk factors for ED visit or readmission: primary c/s    Patient given an opportunity to ask questions and does not have any further questions or concerns at this time. Were discharge instructions

## 2024-12-30 NOTE — TELEPHONE ENCOUNTER
Infant:  Kortney Armstrong (f)  Age 1 month    Called pt to inquire about bfing status. At last visit, pt was exhausted by triple feeds, but planned to continue (bfing, bottle-feeding, pumping for every feeding session). Infant was transferring approx half amount -expected for her age- at breast.  Mimi Sherman and Willard report infant spits up profusely:  during and after both bottle and bfing; at each feeding time. Dad estimates (per their own baby scale) that infant has gained approximately 3 oz in 7 days. Advised that a more appropriate gain at this point, would be around 1 oz per day. Also noted that at [facility], copious spitting up, is an indication of a robust milk supply, with forceful letdowns. However, their weight gain amounts do not seem indicative of oversupply or large feeds at breast. Infant continues to take 2 oz EBM;/formula after each bfing session. Pt is unable to provide all EBM for supplemental feeds. Mom collects 1 1/2 - 2 oz AFTER bfing. Noted how this is very close to an adequate supply considering infant drank 1 oz from breast at last consult.  (3 oz per feeding for a one month-old is minimum average.)    Suggested that Mimi Sherman forego direct bfing during night feeds to aid better rest and lessen fatigue assoc with triple-feeding. Noted that pt should cont to put infant to breast during daytime feeds, following with the ususal 2 oz supplement. Parents are conscientious to do paced bottle feeding.    Pt plans to power pump; has not tried this yet, and to take some form of galactogogue.     Offered further OP lactation appointments or phone support as needed.

## 2025-01-16 ENCOUNTER — POSTPARTUM VISIT (OUTPATIENT)
Age: 40
End: 2025-01-16
Payer: COMMERCIAL

## 2025-01-16 VITALS
HEIGHT: 67 IN | SYSTOLIC BLOOD PRESSURE: 108 MMHG | WEIGHT: 177 LBS | BODY MASS INDEX: 27.78 KG/M2 | DIASTOLIC BLOOD PRESSURE: 82 MMHG

## 2025-01-16 NOTE — PROGRESS NOTES
"Sonia Armstrong is here for a postpartum visit after a  6 weeks ago.  The depression questionnaire has been completed.  She has no signs of postpartum depression today.  She has not had a period since her delivery and is breast-feeding her infant without difficulty.  Her last Pap smear was 2024 and normal, HPV negative.  She has no history of cervical dysplasia.  She had tubal ligation for contraception.    /82 (BP Location: Left arm, Patient Position: Sitting)   Ht 170.2 cm (67\")   Wt 80.3 kg (177 lb)   Breastfeeding Yes   BMI 27.72 kg/m²    In general pleasant female no acute distress  Neck no thyromegaly  Abdomen soft and nontender, the incision is well healed  A Pap smear was not performed.     Assessment: Normal postpartum exam.    We have discussed current Pap smear screening guidelines. Sonia will return in 1 year or sooner if needed.  "

## (undated) DEVICE — 3M™ MEDIPORE™ H SOFT CLOTH SURGICAL TAPE 2868, 8 INCH X 10 YARD (20,3CM X 9,1M), 6 ROLLS/CASE: Brand: 3M™ MEDIPORE™

## (undated) DEVICE — GLOVE,SURG,SENSICARE SLT,LF,PF,8: Brand: MEDLINE

## (undated) DEVICE — ADHS LIQ MASTISOL 2/3ML

## (undated) DEVICE — SUT MNCRYL 0/0 CTX 36IN Y398H

## (undated) DEVICE — PAD C-SECTION: Brand: MEDLINE INDUSTRIES, INC.

## (undated) DEVICE — SUT VIC RAPD SZ 3/0 27IN PS1 VR935

## (undated) DEVICE — 3M™ STERI-STRIP™ REINFORCED ADHESIVE SKIN CLOSURES, R1547, 1/2 IN X 4 IN (12 MM X 100 MM), 6 STRIPS/ENVELOPE: Brand: 3M™ STERI-STRIP™

## (undated) DEVICE — SUT GUT PLN 0 CT3 27IN N864H

## (undated) DEVICE — PATIENT RETURN ELECTRODE, SINGLE-USE, CONTACT QUALITY MONITORING, ADULT, WITH 15 FT (4.5 M) CORD. FOR PATIENTS WEIGHING OVER 33LBS. (15KG): Brand: MEGADYNE

## (undated) DEVICE — APPL CHLORAPREP HI/LITE 26ML ORNG

## (undated) DEVICE — SPNG GZ WOVN 4X4IN 12PLY 10/BX STRL

## (undated) DEVICE — PENCL SMOKE/EVAC MEGADYNE TELESCP 10FT

## (undated) DEVICE — SUT VIC 0 CTX 36IN J978H

## (undated) DEVICE — SUT GUT PLAIN TPR PT 2/0 27IN 53T

## (undated) DEVICE — Device

## (undated) DEVICE — LARGE, DISPOSABLE C-SECTION RETRACTOR: Brand: ALEXIS ® O C-SECTION PROTECTOR/RETRACTOR

## (undated) DEVICE — SUT VIC RAPD SZ 2/0 36IN CT1 VR945 BX/12

## (undated) DEVICE — CVR HNDL LIGHT RIGID